# Patient Record
Sex: FEMALE | Race: WHITE | NOT HISPANIC OR LATINO | Employment: FULL TIME | URBAN - METROPOLITAN AREA
[De-identification: names, ages, dates, MRNs, and addresses within clinical notes are randomized per-mention and may not be internally consistent; named-entity substitution may affect disease eponyms.]

---

## 2018-12-20 LAB
ABORH_EXT (HISTORICAL CONVERSION): NORMAL
ANTIBODY_EXT (HISTORICAL CONVERSION): NEGATIVE
HBSAG_EXT (HISTORICAL CONVERSION): NORMAL
HGB_EXT (HISTORICAL CONVERSION): 13.2
HIV_EXT: NORMAL
PLT_EXT - HISTORICAL: 191
RPR - HISTORICAL: NORMAL
RUBELLA_EXT (HISTORICAL CONVERSION): NORMAL

## 2019-05-09 LAB
HGB_EXT (HISTORICAL CONVERSION): 13.6
PLT_EXT - HISTORICAL: 161

## 2019-07-25 ENCOUNTER — HOSPITAL ENCOUNTER (OUTPATIENT)
Dept: MEDSURG UNIT | Facility: CLINIC | Age: 34
Discharge: HOME | End: 2019-07-25
Attending: OBSTETRICS & GYNECOLOGY | Admitting: OBSTETRICS & GYNECOLOGY

## 2019-07-25 LAB
ABO/RH(D): NORMAL
ALBUMIN UR-MCNC: NEGATIVE MG/DL
ALT SERPL W P-5'-P-CCNC: 31 U/L (ref 0–45)
ANTIBODY SCREEN: NEGATIVE
APTT PPP: 28 SECONDS (ref 24–37)
AST SERPL W P-5'-P-CCNC: 25 U/L (ref 0–40)
CREAT SERPL-MCNC: 0.71 MG/DL (ref 0.6–1.1)
CREAT UR-MCNC: 29.8 MG/DL
ERYTHROCYTE [DISTWIDTH] IN BLOOD BY AUTOMATED COUNT: 12.5 % (ref 11–14.5)
GFR SERPL CREATININE-BSD FRML MDRD: >60 ML/MIN/1.73M2
GLUCOSE UR STRIP-MCNC: NEGATIVE MG/DL
HCT VFR BLD AUTO: 37.5 % (ref 35–47)
HGB BLD-MCNC: 12.9 G/DL (ref 12–16)
INR PPP: 0.94 (ref 0.9–1.1)
KETONES UR STRIP-MCNC: NEGATIVE MG/DL
MCH RBC QN AUTO: 30.6 PG (ref 27–34)
MCHC RBC AUTO-ENTMCNC: 34.4 G/DL (ref 32–36)
MCV RBC AUTO: 89 FL (ref 80–100)
PLATELET # BLD AUTO: 125 THOU/UL (ref 140–440)
PMV BLD AUTO: 12.6 FL (ref 8.5–12.5)
PROTEIN, RANDOM URINE - HISTORICAL: <7 MG/DL
PROTEIN/CREAT RATIO, RANDOM UR: NORMAL
RBC # BLD AUTO: 4.22 MILL/UL (ref 3.8–5.4)
URATE SERPL-MCNC: 5.2 MG/DL (ref 2–7.5)
WBC: 9 THOU/UL (ref 4–11)

## 2019-07-25 ASSESSMENT — MIFFLIN-ST. JEOR: SCORE: 1763.89

## 2019-07-26 ENCOUNTER — ANESTHESIA - HEALTHEAST (OUTPATIENT)
Dept: OBGYN | Facility: CLINIC | Age: 34
End: 2019-07-26

## 2019-07-26 ENCOUNTER — HOME CARE/HOSPICE - HEALTHEAST (OUTPATIENT)
Dept: HOME HEALTH SERVICES | Facility: HOME HEALTH | Age: 34
End: 2019-07-26

## 2019-07-29 ENCOUNTER — HOME CARE/HOSPICE - HEALTHEAST (OUTPATIENT)
Dept: HOME HEALTH SERVICES | Facility: HOME HEALTH | Age: 34
End: 2019-07-29

## 2019-07-29 ENCOUNTER — COMMUNICATION - HEALTHEAST (OUTPATIENT)
Dept: OBGYN | Facility: CLINIC | Age: 34
End: 2019-07-29

## 2019-08-03 ENCOUNTER — AMBULATORY - HEALTHEAST (OUTPATIENT)
Dept: PEDIATRICS | Facility: CLINIC | Age: 34
End: 2019-08-03

## 2019-10-29 ENCOUNTER — OFFICE VISIT - HEALTHEAST (OUTPATIENT)
Dept: FAMILY MEDICINE | Facility: CLINIC | Age: 34
End: 2019-10-29

## 2019-10-29 DIAGNOSIS — Z13.220 SCREENING, LIPID: ICD-10-CM

## 2019-10-29 DIAGNOSIS — R53.82 CHRONIC FATIGUE: ICD-10-CM

## 2019-10-29 LAB
ALBUMIN SERPL-MCNC: 4.9 G/DL (ref 3.5–5)
ALP SERPL-CCNC: 44 U/L (ref 45–120)
ALT SERPL W P-5'-P-CCNC: 16 U/L (ref 0–45)
ANION GAP SERPL CALCULATED.3IONS-SCNC: 14 MMOL/L (ref 5–18)
AST SERPL W P-5'-P-CCNC: 21 U/L (ref 0–40)
BASOPHILS # BLD AUTO: 0 THOU/UL (ref 0–0.2)
BASOPHILS NFR BLD AUTO: 0 % (ref 0–2)
BILIRUB SERPL-MCNC: 0.6 MG/DL (ref 0–1)
BUN SERPL-MCNC: 17 MG/DL (ref 8–22)
CALCIUM SERPL-MCNC: 10.7 MG/DL (ref 8.5–10.5)
CHLORIDE BLD-SCNC: 102 MMOL/L (ref 98–107)
CHOLEST SERPL-MCNC: 240 MG/DL
CO2 SERPL-SCNC: 24 MMOL/L (ref 22–31)
CREAT SERPL-MCNC: 1.07 MG/DL (ref 0.6–1.1)
EOSINOPHIL # BLD AUTO: 0.1 THOU/UL (ref 0–0.4)
EOSINOPHIL NFR BLD AUTO: 2 % (ref 0–6)
ERYTHROCYTE [DISTWIDTH] IN BLOOD BY AUTOMATED COUNT: 12.3 % (ref 11–14.5)
FASTING STATUS PATIENT QL REPORTED: NO
FERRITIN SERPL-MCNC: 81 NG/ML (ref 10–130)
GFR SERPL CREATININE-BSD FRML MDRD: 59 ML/MIN/1.73M2
GLUCOSE BLD-MCNC: 80 MG/DL (ref 70–125)
HCT VFR BLD AUTO: 41.3 % (ref 35–47)
HDLC SERPL-MCNC: 71 MG/DL
HGB BLD-MCNC: 13.7 G/DL (ref 12–16)
IRON SATN MFR SERPL: 32 % (ref 20–50)
IRON SERPL-MCNC: 91 UG/DL (ref 42–175)
LDLC SERPL CALC-MCNC: 157 MG/DL
LYMPHOCYTES # BLD AUTO: 2.1 THOU/UL (ref 0.8–4.4)
LYMPHOCYTES NFR BLD AUTO: 31 % (ref 20–40)
MCH RBC QN AUTO: 30.5 PG (ref 27–34)
MCHC RBC AUTO-ENTMCNC: 33.3 G/DL (ref 32–36)
MCV RBC AUTO: 92 FL (ref 80–100)
MONOCYTES # BLD AUTO: 0.4 THOU/UL (ref 0–0.9)
MONOCYTES NFR BLD AUTO: 6 % (ref 2–10)
NEUTROPHILS # BLD AUTO: 4.1 THOU/UL (ref 2–7.7)
NEUTROPHILS NFR BLD AUTO: 61 % (ref 50–70)
PLATELET # BLD AUTO: 215 THOU/UL (ref 140–440)
PMV BLD AUTO: 8.8 FL (ref 7–10)
POTASSIUM BLD-SCNC: 4.4 MMOL/L (ref 3.5–5)
PROT SERPL-MCNC: 7.8 G/DL (ref 6–8)
RBC # BLD AUTO: 4.51 MILL/UL (ref 3.8–5.4)
SODIUM SERPL-SCNC: 140 MMOL/L (ref 136–145)
TIBC SERPL-MCNC: 286 UG/DL (ref 313–563)
TRANSFERRIN SERPL-MCNC: 229 MG/DL (ref 212–360)
TRIGL SERPL-MCNC: 58 MG/DL
TSH SERPL DL<=0.005 MIU/L-ACNC: 1.69 UIU/ML (ref 0.3–5)
VIT B12 SERPL-MCNC: 753 PG/ML (ref 213–816)
WBC: 6.8 THOU/UL (ref 4–11)

## 2019-10-29 ASSESSMENT — MIFFLIN-ST. JEOR: SCORE: 1595.15

## 2019-10-30 LAB
25(OH)D3 SERPL-MCNC: 53.6 NG/ML (ref 30–80)
25(OH)D3 SERPL-MCNC: 53.6 NG/ML (ref 30–80)

## 2019-10-31 ENCOUNTER — COMMUNICATION - HEALTHEAST (OUTPATIENT)
Dept: FAMILY MEDICINE | Facility: CLINIC | Age: 34
End: 2019-10-31

## 2019-10-31 DIAGNOSIS — R89.9 ABNORMAL LABORATORY TEST: ICD-10-CM

## 2019-11-04 ENCOUNTER — AMBULATORY - HEALTHEAST (OUTPATIENT)
Dept: LAB | Facility: CLINIC | Age: 34
End: 2019-11-04

## 2019-11-04 DIAGNOSIS — R89.9 ABNORMAL LABORATORY TEST: ICD-10-CM

## 2019-11-04 LAB
CALCIUM SERPL-MCNC: 10 MG/DL (ref 8.5–10.5)
PTH-INTACT SERPL-MCNC: 42 PG/ML (ref 10–86)

## 2019-11-05 ENCOUNTER — COMMUNICATION - HEALTHEAST (OUTPATIENT)
Dept: FAMILY MEDICINE | Facility: CLINIC | Age: 34
End: 2019-11-05

## 2020-01-07 ENCOUNTER — COMMUNICATION - HEALTHEAST (OUTPATIENT)
Dept: SCHEDULING | Facility: CLINIC | Age: 35
End: 2020-01-07

## 2020-01-07 ENCOUNTER — OFFICE VISIT - HEALTHEAST (OUTPATIENT)
Dept: FAMILY MEDICINE | Facility: CLINIC | Age: 35
End: 2020-01-07

## 2020-01-07 DIAGNOSIS — R05.9 COUGH: ICD-10-CM

## 2020-01-07 DIAGNOSIS — J11.1 INFLUENZA-LIKE ILLNESS: ICD-10-CM

## 2020-01-07 LAB
FLUAV AG SPEC QL IA: NORMAL
FLUBV AG SPEC QL IA: NORMAL

## 2020-01-16 ENCOUNTER — COMMUNICATION - HEALTHEAST (OUTPATIENT)
Dept: FAMILY MEDICINE | Facility: CLINIC | Age: 35
End: 2020-01-16

## 2020-01-17 ENCOUNTER — OFFICE VISIT - HEALTHEAST (OUTPATIENT)
Dept: FAMILY MEDICINE | Facility: CLINIC | Age: 35
End: 2020-01-17

## 2020-01-17 DIAGNOSIS — R00.2 PALPITATIONS: ICD-10-CM

## 2020-01-17 DIAGNOSIS — R42 DIZZINESS: ICD-10-CM

## 2020-01-22 ENCOUNTER — HOSPITAL ENCOUNTER (OUTPATIENT)
Dept: CARDIOLOGY | Facility: CLINIC | Age: 35
Discharge: HOME | End: 2020-01-22
Attending: FAMILY MEDICINE

## 2020-01-22 DIAGNOSIS — R00.2 PALPITATIONS: ICD-10-CM

## 2020-01-22 DIAGNOSIS — R42 DIZZINESS: ICD-10-CM

## 2020-01-28 ENCOUNTER — COMMUNICATION - HEALTHEAST (OUTPATIENT)
Dept: FAMILY MEDICINE | Facility: CLINIC | Age: 35
End: 2020-01-28

## 2020-01-28 ENCOUNTER — OFFICE VISIT - HEALTHEAST (OUTPATIENT)
Dept: FAMILY MEDICINE | Facility: CLINIC | Age: 35
End: 2020-01-28

## 2020-01-28 DIAGNOSIS — R42 DIZZINESS: ICD-10-CM

## 2020-01-28 DIAGNOSIS — R00.2 PALPITATIONS: ICD-10-CM

## 2020-01-28 LAB
ALBUMIN SERPL-MCNC: 4.2 G/DL (ref 3.5–5)
ALP SERPL-CCNC: 50 U/L (ref 45–120)
ALT SERPL W P-5'-P-CCNC: 11 U/L (ref 0–45)
ANION GAP SERPL CALCULATED.3IONS-SCNC: 16 MMOL/L (ref 5–18)
AST SERPL W P-5'-P-CCNC: 15 U/L (ref 0–40)
BASOPHILS # BLD AUTO: 0.1 THOU/UL (ref 0–0.2)
BASOPHILS NFR BLD AUTO: 1 % (ref 0–2)
BILIRUB SERPL-MCNC: 0.2 MG/DL (ref 0–1)
BUN SERPL-MCNC: 15 MG/DL (ref 8–22)
C REACTIVE PROTEIN LHE: 1.4 MG/DL (ref 0–0.8)
CALCIUM SERPL-MCNC: 9.6 MG/DL (ref 8.5–10.5)
CHLORIDE BLD-SCNC: 102 MMOL/L (ref 98–107)
CO2 SERPL-SCNC: 21 MMOL/L (ref 22–31)
CREAT SERPL-MCNC: 0.85 MG/DL (ref 0.6–1.1)
EOSINOPHIL # BLD AUTO: 0.2 THOU/UL (ref 0–0.4)
EOSINOPHIL NFR BLD AUTO: 1 % (ref 0–6)
ERYTHROCYTE [DISTWIDTH] IN BLOOD BY AUTOMATED COUNT: 11.9 % (ref 11–14.5)
ERYTHROCYTE [SEDIMENTATION RATE] IN BLOOD BY WESTERGREN METHOD: 61 MM/HR (ref 0–20)
GFR SERPL CREATININE-BSD FRML MDRD: >60 ML/MIN/1.73M2
GLUCOSE BLD-MCNC: 98 MG/DL (ref 70–125)
HCT VFR BLD AUTO: 36.6 % (ref 35–47)
HGB BLD-MCNC: 12.6 G/DL (ref 12–16)
LYMPHOCYTES # BLD AUTO: 2.5 THOU/UL (ref 0.8–4.4)
LYMPHOCYTES NFR BLD AUTO: 23 % (ref 20–40)
MCH RBC QN AUTO: 30.3 PG (ref 27–34)
MCHC RBC AUTO-ENTMCNC: 34.3 G/DL (ref 32–36)
MCV RBC AUTO: 88 FL (ref 80–100)
MONOCYTES # BLD AUTO: 0.5 THOU/UL (ref 0–0.9)
MONOCYTES NFR BLD AUTO: 5 % (ref 2–10)
NEUTROPHILS # BLD AUTO: 7.4 THOU/UL (ref 2–7.7)
NEUTROPHILS NFR BLD AUTO: 70 % (ref 50–70)
PLATELET # BLD AUTO: 249 THOU/UL (ref 140–440)
PMV BLD AUTO: 8.6 FL (ref 7–10)
POTASSIUM BLD-SCNC: 4 MMOL/L (ref 3.5–5)
PROT SERPL-MCNC: 7.9 G/DL (ref 6–8)
RBC # BLD AUTO: 4.15 MILL/UL (ref 3.8–5.4)
SODIUM SERPL-SCNC: 139 MMOL/L (ref 136–145)
TSH SERPL DL<=0.005 MIU/L-ACNC: 1.58 UIU/ML (ref 0.3–5)
VIT B12 SERPL-MCNC: 750 PG/ML (ref 213–816)
WBC: 10.6 THOU/UL (ref 4–11)

## 2020-01-28 ASSESSMENT — ANXIETY QUESTIONNAIRES
7. FEELING AFRAID AS IF SOMETHING AWFUL MIGHT HAPPEN: NOT AT ALL
6. BECOMING EASILY ANNOYED OR IRRITABLE: NOT AT ALL
IF YOU CHECKED OFF ANY PROBLEMS ON THIS QUESTIONNAIRE, HOW DIFFICULT HAVE THESE PROBLEMS MADE IT FOR YOU TO DO YOUR WORK, TAKE CARE OF THINGS AT HOME, OR GET ALONG WITH OTHER PEOPLE: NOT DIFFICULT AT ALL
2. NOT BEING ABLE TO STOP OR CONTROL WORRYING: NOT AT ALL
GAD7 TOTAL SCORE: 1
3. WORRYING TOO MUCH ABOUT DIFFERENT THINGS: NOT AT ALL
4. TROUBLE RELAXING: SEVERAL DAYS
5. BEING SO RESTLESS THAT IT IS HARD TO SIT STILL: NOT AT ALL
1. FEELING NERVOUS, ANXIOUS, OR ON EDGE: NOT AT ALL

## 2020-01-28 ASSESSMENT — MIFFLIN-ST. JEOR: SCORE: 1500.8

## 2020-01-28 ASSESSMENT — PATIENT HEALTH QUESTIONNAIRE - PHQ9: SUM OF ALL RESPONSES TO PHQ QUESTIONS 1-9: 8

## 2020-01-29 LAB
25(OH)D3 SERPL-MCNC: 55.9 NG/ML (ref 30–80)
B BURGDOR IGG+IGM SER QL: 0.13 INDEX VALUE

## 2020-01-31 ENCOUNTER — COMMUNICATION - HEALTHEAST (OUTPATIENT)
Dept: FAMILY MEDICINE | Facility: CLINIC | Age: 35
End: 2020-01-31

## 2020-02-03 LAB — ANA SER QL: 0.4 U

## 2020-02-04 ENCOUNTER — AMBULATORY - HEALTHEAST (OUTPATIENT)
Dept: FAMILY MEDICINE | Facility: CLINIC | Age: 35
End: 2020-02-04

## 2020-02-04 DIAGNOSIS — R42 DIZZINESS: ICD-10-CM

## 2020-02-06 ENCOUNTER — AMBULATORY - HEALTHEAST (OUTPATIENT)
Dept: FAMILY MEDICINE | Facility: CLINIC | Age: 35
End: 2020-02-06

## 2020-02-06 DIAGNOSIS — R89.9 ABNORMAL LABORATORY TEST: ICD-10-CM

## 2020-02-07 ENCOUNTER — AMBULATORY - HEALTHEAST (OUTPATIENT)
Dept: LAB | Facility: CLINIC | Age: 35
End: 2020-02-07

## 2020-02-07 DIAGNOSIS — R89.9 ABNORMAL LABORATORY TEST: ICD-10-CM

## 2020-02-07 LAB — CORTIS SERPL-MCNC: 17.4 UG/DL

## 2020-06-25 ENCOUNTER — VIRTUAL VISIT (OUTPATIENT)
Dept: FAMILY MEDICINE | Facility: OTHER | Age: 35
End: 2020-06-25

## 2020-06-26 ENCOUNTER — AMBULATORY - HEALTHEAST (OUTPATIENT)
Dept: FAMILY MEDICINE | Facility: CLINIC | Age: 35
End: 2020-06-26

## 2020-06-26 DIAGNOSIS — Z20.822 SUSPECTED COVID-19 VIRUS INFECTION: ICD-10-CM

## 2020-06-26 DIAGNOSIS — J06.9 ACUTE UPPER RESPIRATORY INFECTION, UNSPECIFIED: ICD-10-CM

## 2020-06-26 NOTE — PROGRESS NOTES
"Date: 2020 12:03:02  Clinician: Paul Eden  Clinician NPI: [id number]  Patient: Vadim Atkinson  Patient : 1985  Patient Address: [address] [place] [address]  Patient Phone: [phone]  Visit Protocol: URI  Patient Summary:  Vadim is a 35 year old ( : 1985 ) female who initiated a Visit for COVID-19 (Coronavirus) evaluation and screening. When asked the question \"Please sign me up to receive news, health information and promotions from [employer].\", Vadim responded \"No\".    Vadim states her symptoms started 1-2 days ago.   Her symptoms consist of nausea, diarrhea, rhinitis, and a sore throat.   Symptom details     Nasal secretions: The color of her mucus is clear.    Sore throat: Vadim reports having moderate throat pain (4-6 on a 10 point pain scale), does not have exudate on her tonsils, and can swallow liquids. She is not sure if the lymph nodes in her neck are enlarged. A rash has not appeared on the skin since the sore throat started.      Vadim denies having wheezing, teeth pain, ageusia, vomiting, malaise, ear pain, headache, chills, myalgias, anosmia, facial pain or pressure, fever, cough, and nasal congestion. She also denies having recent facial or sinus surgery in the past 60 days and taking antibiotic medication in the past month. She is not experiencing dyspnea.   Precipitating events  Within the past week, Vadim has not been exposed to someone with strep throat.   Pertinent COVID-19 (Coronavirus) information  In the past 14 days, Vadim has not worked in a congregate living setting.   She does not work or volunteer as healthcare worker or a  and does not work or volunteer in a healthcare facility.   Vadim also has not lived in a congregate living setting in the past 14 days. She lives with a healthcare worker.   Vadim has not had a close contact with a laboratory-confirmed COVID-19 patient within 14 days of symptom " onset.   Pertinent medical history  Vadim does not get yeast infections when she takes antibiotics.   Vadim does not need a return to work/school note.   Weight: 165 lbs   Vadim does not smoke or use smokeless tobacco.   She is pregnant and denies breastfeeding.   Weight: 165 lbs    MEDICATIONS: doxylamine-pyridoxine (vit B6) oral, ondansetron HCl oral, ALLERGIES: Sulfa (Sulfonamide Antibiotics)  Clinician Response:  Dear Vadim,   Your symptoms show that you may have coronavirus (COVID-19). This illness can cause fever, cough and trouble breathing. Many people get a mild case and get better on their own. Some people can get very sick.  What should I do?  We would like to test you for this virus.   1. Please call [phone] to schedule your visit. Explain that you were referred by [facility] to have a COVID-19 test. Be ready to share your [facility] visit ID number.  The following will serve as your written order for this COVID Test, ordered by me, for the indication of suspected COVID [Z20.828]: The test will be ordered in [employer], our electronic health record, after you are scheduled. It will show as ordered and authorized by Salvador Amador MD.  Order: COVID-19 (Coronavirus) PCR for SYMPTOMATIC testing from [facility].      2. When it's time for your COVID test:  Stay at least 6 feet away from others. (If someone will drive you to your test, stay in the backseat, as far away from the  as you can.)   Cover your mouth and nose with a mask, tissue or washcloth.  Go straight to the testing site. Don't make any stops on the way there or back.      3.Starting now: Stay home and away from others (self-isolate) until:   You've had no fever---and no medicine that reduces fever---for 3 full days (72 hours). And...   Your other symptoms have gotten better. For example, your cough or breathing has improved. And...   At least 10 days have passed since your symptoms started.       During this time, don't leave the house  "except for testing or medical care.   Stay in your own room, even for meals. Use your own bathroom if you can.   Stay away from others in your home. No hugging, kissing or shaking hands. No visitors.  Don't go to work, school or anywhere else.    Clean \"high touch\" surfaces often (doorknobs, counters, handles, etc.). Use a household cleaning spray or wipes. You'll find a full list of  on the EPA website: www.epa.gov/pesticide-registration/list-n-disinfectants-use-against-sars-cov-2.   Cover your mouth and nose with a mask, tissue or washcloth to avoid spreading germs.  Wash your hands and face often. Use soap and water.  Caregivers in these groups are at risk for severe illness due to COVID-19:  o People 65 years and older  o People who live in a nursing home or long-term care facility  o People with chronic disease (lung, heart, cancer, diabetes, kidney, liver, immunologic)  o People who have a weakened immune system, including those who:   Are in cancer treatment  Take medicine that weakens the immune system, such as corticosteroids  Had a bone marrow or organ transplant  Have an immune deficiency  Have poorly controlled HIV or AIDS  Are obese (body mass index of 40 or higher)  Smoke regularly   o Caregivers should wear gloves while washing dishes, handling laundry and cleaning bedrooms and bathrooms.  o Use caution when washing and drying laundry: Don't shake dirty laundry, and use the warmest water setting that you can.  o For more tips, go to www.cdc.gov/coronavirus/2019-ncov/downloads/10Things.pdf.      How can I take care of myself?   Get lots of rest. Drink extra fluids (unless a doctor has told you not to).   Take Tylenol (acetaminophen) for fever or pain. If you have liver or kidney problems, ask your family doctor if it's okay to take Tylenol.   Adults can take either:    650 mg (two 325 mg pills) every 4 to 6 hours, or...   1,000 mg (two 500 mg pills) every 8 hours as needed.    Note: Don't take " more than 3,000 mg in one day. Acetaminophen is found in many medicines (both prescribed and over-the-counter medicines). Read all labels to be sure you don't take too much.   For children, check the Tylenol bottle for the right dose. The dose is based on the child's age or weight.    If you have other health problems (like cancer, heart failure, an organ transplant or severe kidney disease): Call your specialty clinic if you don't feel better in the next 2 days.       Know when to call 911. Emergency warning signs include:    Trouble breathing or shortness of breath Pain or pressure in the chest that doesn't go away Feeling confused like you haven't felt before, or not being able to wake up Bluish-colored lips or face.  Where can I get more information?   [facility] -- About COVID-19: [email or web address]   CDC -- What to Do If You're Sick: www.cdc.gov/coronavirus/2019-ncov/about/steps-when-sick.html   CDC -- Ending Home Isolation: www.cdc.gov/coronavirus/2019-ncov/hcp/disposition-in-home-patients.html   CDC -- Caring for Someone: www.cdc.gov/coronavirus/2019-ncov/if-you-are-sick/care-for-someone.html   [facility] -- Interim Guidance for Hospital Discharge to Home: www.health.[facility].[place].us/diseases/coronavirus/hcp/hospdischarge.pdf   [facility] clinical trials (COVID-19 research studies): clinicalaffairs.[facility]-clinical-trials    Below are the COVID-19 hotlines at the [facility] ([facility]). Interpreters are available.    For health questions: Call 996-369-5643 or 1-268.240.9399 (7 a.m. to 7 p.m.) For questions about schools and childcare: Call 429-760-0726 or 1-443.959.7539 (7 a.m. to 7 p.m.)    Diagnosis: Acute upper respiratory infection, unspecified  Diagnosis ICD: J06.9

## 2020-06-27 ENCOUNTER — AMBULATORY - HEALTHEAST (OUTPATIENT)
Dept: FAMILY MEDICINE | Facility: CLINIC | Age: 35
End: 2020-06-27

## 2020-06-27 DIAGNOSIS — Z20.822 SUSPECTED COVID-19 VIRUS INFECTION: ICD-10-CM

## 2020-06-30 ENCOUNTER — COMMUNICATION - HEALTHEAST (OUTPATIENT)
Dept: FAMILY MEDICINE | Facility: CLINIC | Age: 35
End: 2020-06-30

## 2020-11-09 ENCOUNTER — VIRTUAL VISIT (OUTPATIENT)
Dept: FAMILY MEDICINE | Facility: OTHER | Age: 35
End: 2020-11-09

## 2020-11-10 NOTE — PROGRESS NOTES
"Date: 2020 21:42:11  Clinician: Jem Byrd  Clinician NPI: [id number]  Patient: Vadim Atkinson  Patient : 1985  Patient Address: [address] [place] [address]  Patient Phone: [phone]  Visit Protocol: URI  Patient Summary:  Vadim is a 35 year old ( : 1985 ) female who initiated a [facility] Visit for COVID-19 (Coronavirus) evaluation and screening. When asked the question \"Please sign me up to receive news, health information and promotions from [facility].\", Vadim responded \"No\".    Vadim states her symptoms started 1-2 days ago.   Her symptoms consist of rhinitis, malaise, a sore throat, a cough, and nausea.   Symptom details     Nasal secretions: The color of her mucus is yellow and clear.    Cough: Vadim coughs a few times an hour and her cough is not more bothersome at night. Phlegm does not come into her throat when she coughs. She does not believe her cough is caused by post-nasal drip.     Sore throat: Vadim reports having moderate throat pain (4-6 on a 10 point pain scale), does not have exudate on her tonsils, and can swallow liquids. The lymph nodes in her neck are not enlarged. A rash has not appeared on the skin since the sore throat started.      Vadim denies having vomiting, facial pain or pressure, myalgias, chills, teeth pain, ageusia, diarrhea, ear pain, headache, wheezing, fever, enlarged lymph nodes, nasal congestion, and anosmia. She also denies taking antibiotic medication in the past month and having recent facial or sinus surgery in the past 60 days. She is not experiencing dyspnea.   Precipitating events  Within the past week, Vadim has not been exposed to someone with strep throat. She has not recently been exposed to someone with influenza. Vadim has been in close contact with the following high risk individuals: pregnant women and children under the age of 5.   Pertinent COVID-19 (Coronavirus) information  Vadim " does not work or volunteer as healthcare worker or a . In the past 14 days, Vadim has not worked or volunteered at a healthcare facility or group living setting.   In the past 14 days, she also has not lived in a congregate living setting.   Vadim has not had a close contact with a laboratory-confirmed COVID-19 patient within 14 days of symptom onset.    Since December 2019, Vadim has been tested for COVID-19 and has had upper respiratory infection (URI) or influenza-like illness.      Result of COVID-19 test: Negative     Date of her COVID-19 test: 05/25/2020     Date(s) of previous URI or influenza-like illness (free-text): Mid January 2020     Symptoms Vadim experienced during previous URI or influenza-like illness as reported by the patient (free-text): High fever, headache, muscle pain, sweating, fatigue, congestion, palpitations, dizziness, vomiting        Pertinent medical history  Vadim does not get yeast infections when she takes antibiotics.   Vadim does not need a return to work/school note.   Weight: 186 lbs   Vadim does not smoke or use smokeless tobacco.   She is pregnant and denies breastfeeding.   Weight: 186 lbs    MEDICATIONS: doxylamine-pyridoxine (vit B6) oral, ondansetron HCl oral, ALLERGIES: Sulfa (Sulfonamide Antibiotics), Sulfa (Sulfonamide Antibiotics)  Clinician Response:  Dear Vadim,   Your symptoms show that you may have coronavirus (COVID-19). This illness can cause fever, cough and trouble breathing. Many people get a mild case and get better on their own. Some people can get very sick.  What should I do?  We would like to test you for this virus.   1. Please call [phone] to schedule your visit. Explain that you were referred by [facility] to have a COVID-19 test. Be ready to share your [facility] visit ID number.  * If you need to schedule in [facility] please call [phone] or for [employer] employees please call [phone].  * If  "you need to schedule in the [place] area please call [phone]. [place] employees call [phone].  The following will serve as your written order for this COVID Test, ordered by me, for the indication of suspected COVID [Z20.828]: The test will be ordered in [employer], our electronic health record, after you are scheduled. It will show as ordered and authorized by Salvador Amador MD.  Order: COVID-19 (Coronavirus) PCR for SYMPTOMATIC testing from [facility].   2. When it's time for your COVID test:  Stay at least 6 feet away from others. (If someone will drive you to your test, stay in the backseat, as far away from the  as you can.)   Cover your mouth and nose with a mask, tissue or washcloth.  Go straight to the testing site. Don't make any stops on the way there or back.      3.Starting now: Stay home and away from others (self-isolate) until:   You've had no fever---and no medicine that reduces fever---for one full day (24 hours). And...   Your other symptoms have gotten better. For example, your cough or breathing has improved. And...   At least 10 days have passed since your symptoms started.       During this time, don't leave the house except for testing or medical care.   Stay in your own room, even for meals. Use your own bathroom if you can.   Stay away from others in your home. No hugging, kissing or shaking hands. No visitors.  Don't go to work, school or anywhere else.    Clean \"high touch\" surfaces often (doorknobs, counters, handles, etc.). Use a household cleaning spray or wipes. You'll find a full list of  on the EPA website: www.epa.gov/pesticide-registration/list-n-disinfectants-use-against-sars-cov-2.   Cover your mouth and nose with a mask, tissue or washcloth to avoid spreading germs.  Wash your hands and face often. Use soap and water.  Caregivers in these groups are at risk for severe illness due to COVID-19:  o People 65 years and older  o People who live in a nursing home or long-term " care facility  o People with chronic disease (lung, heart, cancer, diabetes, kidney, liver, immunologic)  o People who have a weakened immune system, including those who:   Are in cancer treatment  Take medicine that weakens the immune system, such as corticosteroids  Had a bone marrow or organ transplant  Have an immune deficiency  Have poorly controlled HIV or AIDS  Are obese (body mass index of 40 or higher)  Smoke regularly   o Caregivers should wear gloves while washing dishes, handling laundry and cleaning bedrooms and bathrooms.  o Use caution when washing and drying laundry: Don't shake dirty laundry, and use the warmest water setting that you can.  o For more tips, go to www.cdc.gov/coronavirus/2019-ncov/downloads/10Things.pdf.    4.Sign up for [employer]. We know it's scary to hear that you might have COVID-19. We want to track your symptoms to make sure you're okay over the next 2 weeks. Please look for an email from [employer]---this is a free, online program that we'll use to keep in touch. To sign up, follow the link in the email. Learn more at [email or web address]  How can I take care of myself?   Get lots of rest. Drink extra fluids (unless a doctor has told you not to).   Take Tylenol (acetaminophen) for fever or pain. If you have liver or kidney problems, ask your family doctor if it's okay to take Tylenol.   Adults can take either:    650 mg (two 325 mg pills) every 4 to 6 hours, or...   1,000 mg (two 500 mg pills) every 8 hours as needed.    Note: Don't take more than 3,000 mg in one day. Acetaminophen is found in many medicines (both prescribed and over-the-counter medicines). Read all labels to be sure you don't take too much.   For children, check the Tylenol bottle for the right dose. The dose is based on the child's age or weight.    If you have other health problems (like cancer, heart failure, an organ transplant or severe kidney disease): Call your specialty clinic if you  don't feel better in the next 2 days.       Know when to call 911. Emergency warning signs include:    Trouble breathing or shortness of breath Pain or pressure in the chest that doesn't go away Feeling confused like you haven't felt before, or not being able to wake up Bluish-colored lips or face.  Where can I get more information?   [facility] -- About COVID-19: [email or web address]   CDC -- What to Do If You're Sick: www.cdc.gov/coronavirus/2019-ncov/about/steps-when-sick.html   CDC -- Ending Home Isolation: www.cdc.gov/coronavirus/2019-ncov/hcp/disposition-in-home-patients.html   CDC -- Caring for Someone: www.cdc.gov/coronavirus/2019-ncov/if-you-are-sick/care-for-someone.html   [facility] -- Interim Guidance for Hospital Discharge to Home: www.[facility].[place]./diseases/coronavirus/hcp/hospdischarge.pdf   [facility] clinical trials (COVID-19 research studies): clinicalaffairs.[facility]-clinical-trials    Below are the COVID-19 hotlines at the [facility] ([facility]). Interpreters are available.    For health questions: Call 068-193-9585 or 1-813.550.5857 (7 a.m. to 7 p.m.) For questions about schools and childcare: Call 942-916-9777 or 1-276.757.1516 (7 a.m. to 7 p.m.)    Diagnosis: Cough  Diagnosis ICD: R05

## 2020-11-13 ENCOUNTER — AMBULATORY - HEALTHEAST (OUTPATIENT)
Dept: FAMILY MEDICINE | Facility: CLINIC | Age: 35
End: 2020-11-13

## 2020-11-13 DIAGNOSIS — Z20.822 SUSPECTED 2019 NOVEL CORONAVIRUS INFECTION: ICD-10-CM

## 2020-11-15 ENCOUNTER — COMMUNICATION - HEALTHEAST (OUTPATIENT)
Dept: SCHEDULING | Facility: CLINIC | Age: 35
End: 2020-11-15

## 2021-01-15 ENCOUNTER — ANESTHESIA - HEALTHEAST (OUTPATIENT)
Dept: OBGYN | Facility: CLINIC | Age: 36
End: 2021-01-15

## 2021-01-15 ENCOUNTER — COMMUNICATION - HEALTHEAST (OUTPATIENT)
Dept: SCHEDULING | Facility: CLINIC | Age: 36
End: 2021-01-15

## 2021-01-16 ENCOUNTER — HOME CARE/HOSPICE - HEALTHEAST (OUTPATIENT)
Dept: HOME HEALTH SERVICES | Facility: HOME HEALTH | Age: 36
End: 2021-01-16

## 2021-01-19 ENCOUNTER — HOME CARE/HOSPICE - HEALTHEAST (OUTPATIENT)
Dept: HOME HEALTH SERVICES | Facility: HOME HEALTH | Age: 36
End: 2021-01-19

## 2021-01-21 ENCOUNTER — COMMUNICATION - HEALTHEAST (OUTPATIENT)
Dept: SCHEDULING | Facility: CLINIC | Age: 36
End: 2021-01-21

## 2021-03-16 ENCOUNTER — RECORDS - HEALTHEAST (OUTPATIENT)
Dept: ADMINISTRATIVE | Facility: OTHER | Age: 36
End: 2021-03-16

## 2021-03-22 ENCOUNTER — OFFICE VISIT - HEALTHEAST (OUTPATIENT)
Dept: CARDIOLOGY | Facility: CLINIC | Age: 36
End: 2021-03-22

## 2021-03-22 DIAGNOSIS — I10 ESSENTIAL HYPERTENSION: ICD-10-CM

## 2021-03-22 ASSESSMENT — MIFFLIN-ST. JEOR: SCORE: 1576.78

## 2021-04-08 ENCOUNTER — RECORDS - HEALTHEAST (OUTPATIENT)
Dept: ADMINISTRATIVE | Facility: OTHER | Age: 36
End: 2021-04-08

## 2021-05-27 VITALS
DIASTOLIC BLOOD PRESSURE: 85 MMHG | HEART RATE: 57 BPM | SYSTOLIC BLOOD PRESSURE: 132 MMHG | TEMPERATURE: 98.3 F | RESPIRATION RATE: 16 BRPM

## 2021-05-27 ASSESSMENT — PATIENT HEALTH QUESTIONNAIRE - PHQ9: SUM OF ALL RESPONSES TO PHQ QUESTIONS 1-9: 8

## 2021-05-28 ASSESSMENT — ANXIETY QUESTIONNAIRES: GAD7 TOTAL SCORE: 1

## 2021-05-30 NOTE — PLAN OF CARE
Updated Dr. Reich regarding lab results, BP results, 02 sats at 100%, category 1 tracing with ctx q2-5 minutes. Plan to offer SVE to patient to help determine logical plan. Given pt has had 1 elevated BP in clinic and has had 2 here she would recommend induction but would like to talk to patient.

## 2021-05-30 NOTE — TELEPHONE ENCOUNTER
OB Follow Up Phone Call    Patient: Vadim Atkinson  : 1985  MRN: [id number]     [facility]  Provider No primary care provider on file.      :   N/A    Language:   English    Discharge Follow-Up:  Follow-Up call by Outreach nurse: Message left for patient    Type of Delivery:      Feeding Method:  Breastfeeding    Comments:   Left message with [facility] phone number for patient to call back if desired. Reminded patient to schedule postpartum follow-up appointment as directed by PCP at discharge.  Encouraged patient to call clinic/PCP with questions or concerns.    Completed by:   Francoise Mcmahon RN      Patient: Vadim Atkinson  : 1985  MRN: [id number]

## 2021-05-31 ENCOUNTER — RECORDS - HEALTHEAST (OUTPATIENT)
Dept: ADMINISTRATIVE | Facility: CLINIC | Age: 36
End: 2021-05-31

## 2021-05-31 NOTE — PROGRESS NOTES
"[facility] Pediatrics Lactation Visit     Assessment:     1.  difficulty in feeding at breast         Marium has had appropriate weight gain and is back to birth weight today. She has been primarily bottle fed and mom is pumping every time she is fed. Marium is able to latch to the breast appropriately and occasional swallows are heard, but her latch feels weak to mom. She transferred 0.8 oz at the breast today. She does have a tight lingual frenulum with attachment 3 mm from apex of tongue. She is able to extend her tongue past her gum line. Lotus assessment scored her a 7 (down from 12 in the hospital). Dad has a history of tongue tie which was revised when he was a toddler. Typically revision is not recommended unless there is a score of 12+, but I do think this may be contributing to her lack of transfer. Parents plan to follow up with Dr. Arevalo at [facility] in 4 days.      Mom's milk supply is slightly low but very close to meeting 100% of Marium's needs. She currently has hypertension controlled with labetalol and nifedipine which is likely a contributing factor to mom's slightly reduced supply.      Plan:     Breastfeed on demand, as many times per day as is right for you. Aim for at least a couple of times per day.     Offer both sides every time, and alternate which breast you start on. Latch baby deeply by making a \"breast sandwich,\" and aim your nipple for the roof of the mouth. If baby's lips are rolled inward, flip the top lip out with your finger, and then apply gentle downward pressure to the chin to help the lips flange out like \"fish lips.\" If you have pain that lasts beyond the initial latch-on, always restart. When sucking/swallowing frequency starts to slow down, do breast compressions/massage and tickle baby's feet to keep her alert with feeding. A diaper change between sides can be helpful to keep her alert.     Supplementation plan: Continue to offer expressed breast milk or formula " as she cues. See chart below for typical intake by age       Recommended to pump: Aim to pump at the same frequency that she eats for maximum milk production. OK to get a 4 hour stretch over night and then make it up during the day so that you pump at least 8 times in 24 hours.  Continue to monitor output, expect at least 6 wet diapers per day.      Follow up as needed for ongoing lactation support           SUBJECTIVE:      Marium is here today with mom, Destiny, and dad Terry, for lactation support. She is a 8 days female born at Gestational Age: 39w3d now 8 days.    She is doing well. She has gained 7 oz since last visit 5 days ago. She has gained approximately 1.4 oz per day over the past 5 days.     Baby is nursing about 2-3 times per day, usually only once is successful. She typically nurses for about 5 minutes per session.   Mother reports usually not hearing audible swallows.   Baby bottle feeds about 10 times in 24 hours.   Baby is supplemented with expressed breast milk or formula, about 1.5 - 3 oz at a time.  Mom is also pumping about 10 times per day and gets about 1.5 - 4 oz per pumping session.  Number of wet diapers in 24 hours: 7  Number of stools in 24 hours: 6  Color and consistency of stools: yellow, seedy  Mom noticed her breasts grew larger and areolas darkened during pregnancy and she noticed primary engorgement when her milk came in on day 3.        Breastfeeding Goals: Mom would like to nurse 85% of the time and then pump and give two bottles per day     Previous Breastfeeding Experience: none  Breast-surgery: no  Maternal medications: Labetalol, nifedipine      Hospital course: Concerns: Baby had persistent low sugars with attempted breastfeeding during the first 24 hours of life, so she received glucose gel and donor breast milk to supplement. She was transitioned to 24 kcal formula for 1 day while her blood sugar stabilized. On the morning of discharge, she transitioned to breastfeeding  followed by 22 kcal formula supplementation. Spot blood glucose checks were normal. Parents were comfortable with breastfeeding and then offering formula afterward until she follows up with her PCP. She is voiding and stooling normally.              Results for orders placed or performed during the hospital encounter of 19   [place] Metabolic Screen   Result Value Ref Range     Scan Result See Scanned Report     POCT Glucose - STAT   Result Value Ref Range     Glucose 39 (LL) 51 - 139 mg/dL   POCT Glucose   Result Value Ref Range     Glucose 38 (LL) 51 - 139 mg/dL   POCT Glucose   Result Value Ref Range     Glucose 54 51 - 139 mg/dL   POCT Glucose   Result Value Ref Range     Glucose 36 (LL) 51 - 139 mg/dL   POCT Glucose   Result Value Ref Range     Glucose 49 (L) 51 - 139 mg/dL   POCT Glucose   Result Value Ref Range     Glucose 73 51 - 139 mg/dL   POCT Glucose   Result Value Ref Range     Glucose 43 (L) 51 - 139 mg/dL   POCT Glucose   Result Value Ref Range     Glucose 56 51 - 139 mg/dL   POCT Glucose   Result Value Ref Range     Glucose 41 (L) 51 - 139 mg/dL   POCT Glucose   Result Value Ref Range     Glucose 42 (L) 51 - 139 mg/dL   POCT Glucose   Result Value Ref Range     Glucose 60 51 - 139 mg/dL   POCT Glucose   Result Value Ref Range     Glucose 66 51 - 139 mg/dL   POCT Glucose   Result Value Ref Range     Glucose 42 (L) 51 - 139 mg/dL   POCT Glucose   Result Value Ref Range     Glucose 53 51 - 139 mg/dL   POCT Glucose   Result Value Ref Range     Glucose 62 51 - 139 mg/dL   POCT Glucose   Result Value Ref Range     Glucose 46 (L) 51 - 139 mg/dL   POCT Glucose   Result Value Ref Range     Glucose 52 51 - 139 mg/dL   POCT Glucose   Result Value Ref Range     Glucose 57 51 - 139 mg/dL      No current outpatient medications on file.  No past medical history on file.  No past surgical history on file.  No family history on file.        Primary care provider: Marilynn Saez,  "MD     OBJECTIVE:     Mother:   Nipples are everted, the areola is compressible, the breast is soft.      Sore nipples: None   Maternal depression screening: Doing well  EPDS: 2     Infant:      Age today: 8 days         Vitals:     08/03/19 1503   Pulse: 152   Temp: 98.8  F (37.1  C)   SpO2: 99%            Weight:       Wt Readings from Last 3 Encounters:   08/03/19 7 lb 13.2 oz (3.549 kg) (55 %, Z= 0.13)*   07/29/19 7 lb 6 oz (3.345 kg) (52 %, Z= 0.04)*   07/27/19 7 lb 11.6 oz (3.504 kg) (69 %, Z= 0.51)*      * Growth percentiles are based on WHO (Girls, 0-2 years) data.         Birthweight:  7 lb 14 oz (3.572 kg).   Today's weight:    Vitals       Vitals:     08/03/19 1503   Weight: 7 lb 13.2 oz (3.549 kg)      . This is -1% from birth weight.   Average weight gain over last 5 days: 1.4 oz/day.        Test weights:     LEFT side: 0.3 oz  RIGHT side: 0.5 oz     TOTAL transfer:  0.8 oz        Feeding assessment:      Digital suck assessment:  Infant draws consultant's finger into mouth, palate intact, tongue over gums, tight lingual frenulum with attachment between mid and apex of tongue.      Baby can hold suction with tongue while at the breast. Mom feels her suck was \"weak\" - she felt that it was stronger in the hospital.     Alignment: Baby's head was aligned with its trunk. Baby did face mother. Baby was in football position today.      Areolar Grasp: Baby was able to open mouth wide. Baby's lips were not pursed. Baby's lips did flange outward. Tongue was visible just barely over bottom lip. Baby had complete seal.      Areolar Compression: Baby made rhythmic motion. There were no clicking or smacking sounds. There was no severe nipple discomfort.  Nipples appeared round after feeding.     Audible swallowing: Baby made quiet sounds of swallowing: There was an increase in frequency after milk ejection reflex. The milk ejection reflex is appropriate and milk supply appears adequate/ slightly low.      PHYSICAL " EXAM     Gen: Alert, no acute distress.   Head: Anterior fontanelle flat and soft.   Mouth:Lips pink. Oral mucosa moist. Tongue midline (good lateralization, movement, and lift; able to extend pass lower gumline).  Palate intact. Coordinated suck. tight lingual frenulum with attachment between mid and apex of tongue   Lungs: Clear to auscultation bilaterally.   Cardiac: Regular regular rate and rhythm, S1S2, no murmurs.  Abdomen: Soft, nontender, bowel sounds present, no hepatosplenomegaly or mass palpable. Umbilicus dry with no erythema or drainage.   : Shaka stage 1 female genitalia  Skin: Intact, dry, appropriate coloring for ethnicity, no jaundice.   Neuro: Appropriate muscle tone.     The visit lasted a total of 60 minutes that I spent face to face with the patient. Of that time, 60 minutes were spent on lactation. Over 50% of the time was spent counseling and educating the patient about feeding difficulties and lactation concerns.      Completed by:   LILY Iverson, IBCLC, [facility], Pediatrics.  8/3/2019 3:16 PM

## 2021-06-02 NOTE — PROGRESS NOTES
ASSESSMENT:  1. Chronic fatigue    We did spend some time today discussing that some of these changes that she is experiencing may be due to the fact that she had some pretty significant preeclampsia during the last trimester of her pregnancy and that did take some time for her body to readjust, operatively since she has been on medications recently and now is off of them.  We will however do some blood work to make sure that there is no other reasons why she is having these issues, and we can follow-up with her on the results when they become available.    Otherwise we discussed getting good amounts of exercise, drinking adequate amounts of fluids, and eating well.  She also understands the importance of getting some sleep which of course is a bit challenging at this stage of her child's infancy.    - HM1(CBC and Differential)  - Comprehensive Metabolic Panel  - Thyroid Stimulating Hormone (TSH)  - Iron and Transferrin Iron Binding Capacity  - Ferritin  - Vitamin B12  - Vitamin D, Total (25-Hydroxy)  - HM1 (CBC with Diff)    2. Screening, lipid    - Lipid Profile          PLAN:  There are no Patient Instructions on file for this visit.    Orders Placed This Encounter   Procedures     Lipid Profile     Order Specific Question:   Fasting is required?     Answer:   Yes     Comprehensive Metabolic Panel     Thyroid Stimulating Hormone (TSH)     Iron and Transferrin Iron Binding Capacity     Ferritin     Vitamin B12     Vitamin D, Total (25-Hydroxy)     HM1 (CBC with Diff)     Medications Discontinued During This Encounter   Medication Reason     labetalol (NORMODYNE) 300 MG tablet Therapy completed     NIFEdipine (PROCARDIA XL) 30 MG 24 hr tablet Therapy completed       No follow-ups on file.    CHIEF COMPLAINT:  Chief Complaint   Patient presents with     Establish Care     Orthostatic hypertension and dizziness and vision narrowing/blackness, she is 3 months port partum and had pre-eclampsia       HISTORY OF PRESENT  "ILLNESS:  Vadim is a 34 y.o. female presenting to the clinic today with some concerns about dizziness and fatigue.  She has been to this clinic before but it for more than 3 years.  She has been describing some feelings of orthostatic hypotension and dizziness with some vision changes and almost blacking out but never quite passing out.    Of note she is 3 months postpartum, and during the pregnancy she had a significant amount of preeclampsia and her blood pressure actually needed medication.  She had some of the symptoms when she went on medication and now that her blood pressure is resolved she is gone off of the medication and feeling in a similar way.  She is been trying to exercise when she can.  She has not really been sleeping well of course with a small infant.  She will get the sometimes when going from sitting to standing or laying to standing.  She will get the familiar \"head rush\" feeling but it seems to be a bit worse and is starting to get slowly better over the last couple of weeks.    REVIEW OF SYSTEMS:     All other systems are negative.    PFSH:    Reviewed      TOBACCO USE:  Social History     Tobacco Use   Smoking Status Never Smoker   Smokeless Tobacco Never Used       VITALS:  Vitals:    10/29/19 1453   BP: 124/76   Patient Site: Left Arm   Patient Position: Sitting   Cuff Size: Adult Regular   Pulse: 60   SpO2: 99%   Weight: 183 lb 12.8 oz (83.4 kg)   Height: 5' 8.5\" (1.74 m)     Wt Readings from Last 3 Encounters:   10/29/19 183 lb 12.8 oz (83.4 kg)   07/31/19 208 lb (94.3 kg)   07/25/19 221 lb (100.2 kg)     Body mass index is 27.54 kg/m .    PHYSICAL EXAM:  Constitutional:  Well appearing patient in no acute distress.  Vitals:  Per nursing notes.    HEENT:  Normocephalic, atraumatic.  Ears are clear bilaterally, with no fluid or redness, and landmarks visible.  Pupils are equal and reactive to light, extraocular muscles intact, visual fields are full.  Nose is normal, and oropharynx is " clear without redness.    Neck is without lymphadenopathy.    Lungs:  Clear to auscultation bilaterally without wheezes, rales or rhonchi.   Cardiac:  Regular rate and rhythm without murmurs, rubs, or gallops.     Legs show no cyanosis, clubbing or edema.  Palpation of the distal pulses are normal and symmetric.    Neurologic:  Cranial nerves II-XII intact.   Normal and symmetric reflexes in extremities, with normal strength and sensation.  Psychiatric:  Mood appropriate, memory intact.         MEDICATIONS:  Current Outpatient Medications   Medication Sig Dispense Refill     PNV/ferrous sulfate/folic acid (PRENATAL VIT-IRON-FOLIC ACID ORAL) Take by mouth.       No current facility-administered medications for this visit.

## 2021-06-03 VITALS
HEART RATE: 60 BPM | DIASTOLIC BLOOD PRESSURE: 76 MMHG | HEIGHT: 69 IN | OXYGEN SATURATION: 99 % | WEIGHT: 183.8 LBS | SYSTOLIC BLOOD PRESSURE: 124 MMHG | BODY MASS INDEX: 27.22 KG/M2

## 2021-06-03 VITALS — HEIGHT: 69 IN | WEIGHT: 221 LBS | BODY MASS INDEX: 32.73 KG/M2

## 2021-06-04 VITALS
BODY MASS INDEX: 24.54 KG/M2 | HEART RATE: 76 BPM | DIASTOLIC BLOOD PRESSURE: 88 MMHG | SYSTOLIC BLOOD PRESSURE: 110 MMHG | WEIGHT: 163.8 LBS

## 2021-06-04 VITALS
SYSTOLIC BLOOD PRESSURE: 117 MMHG | WEIGHT: 168 LBS | DIASTOLIC BLOOD PRESSURE: 80 MMHG | BODY MASS INDEX: 25.17 KG/M2 | TEMPERATURE: 98.2 F | HEART RATE: 86 BPM | OXYGEN SATURATION: 97 % | RESPIRATION RATE: 18 BRPM

## 2021-06-04 VITALS
BODY MASS INDEX: 24.14 KG/M2 | DIASTOLIC BLOOD PRESSURE: 88 MMHG | HEIGHT: 69 IN | SYSTOLIC BLOOD PRESSURE: 124 MMHG | WEIGHT: 163 LBS | HEART RATE: 78 BPM | OXYGEN SATURATION: 100 %

## 2021-06-05 VITALS
DIASTOLIC BLOOD PRESSURE: 84 MMHG | SYSTOLIC BLOOD PRESSURE: 124 MMHG | HEIGHT: 69 IN | HEART RATE: 80 BPM | WEIGHT: 178 LBS | BODY MASS INDEX: 26.36 KG/M2 | RESPIRATION RATE: 16 BRPM

## 2021-06-05 NOTE — PROGRESS NOTES
ASSESSMENT:  1. Palpitations    We will do a bit of a blood work-up today for these palpitations.  We can follow-up with her on the results of the become available.  We can address any abnormalities as they come up.    Interestingly she did have a few irregular beats here today, it seems more so than what was caught on the Holter.  If all of this work-up comes up negative, we may still send her to a cardiologist for evaluation but we will see what happens with the labs first.      - HM1(CBC and Differential)  - Comprehensive Metabolic Panel  - Vitamin B12  - Lyme Antibody Cascade  - HM1 (CBC with Diff)  - Thyroid Stimulating Hormone (TSH)    2. Dizziness    - Vitamin D, Total (25-Hydroxy)  - Erythrocyte Sedimentation Rate  - C-Reactive Protein          PLAN:  There are no Patient Instructions on file for this visit.    Orders Placed This Encounter   Procedures     Comprehensive Metabolic Panel     Vitamin B12     Vitamin D, Total (25-Hydroxy)     Erythrocyte Sedimentation Rate     C-Reactive Protein     Lyme Antibody Cascade     HM1 (CBC with Diff)     Thyroid Stimulating Hormone (TSH)     Medications Discontinued During This Encounter   Medication Reason     albuterol (PROAIR HFA;PROVENTIL HFA;VENTOLIN HFA) 90 mcg/actuation inhaler        No follow-ups on file.    CHIEF COMPLAINT:  Chief Complaint   Patient presents with     Follow-up     Dizziness and review Holter Resuults, Fatigue       HISTORY OF PRESENT ILLNESS:  Vadim is a 34 y.o. female presenting to the clinic today for follow-up.  We did a Holter monitor for some palpitations that the patient was having.  The Holter actually turned up to be pretty normal.  She had a couple of normal PVCs but no runs or couplets.  She did have a couple of times where she felt the symptoms and was noted to have a couple of PVCs at those times but again without any runs it was really called a normal Holter.  She still is having some of these episodes.  She is wondering  "what the next steps might be.  She never gets chest pain or nausea with them.    REVIEW OF SYSTEMS:     All other systems are negative.    PFSH:        TOBACCO USE:  Social History     Tobacco Use   Smoking Status Never Smoker   Smokeless Tobacco Never Used       VITALS:  Vitals:    01/28/20 1513   BP: 124/88   Patient Site: Left Arm   Patient Position: Sitting   Cuff Size: Adult Regular   Pulse: 78   SpO2: 100%   Weight: 163 lb (73.9 kg)   Height: 5' 8.5\" (1.74 m)     Wt Readings from Last 3 Encounters:   01/28/20 163 lb (73.9 kg)   01/17/20 163 lb 12.8 oz (74.3 kg)   01/07/20 168 lb (76.2 kg)     Body mass index is 24.42 kg/m .    PHYSICAL EXAM:  Constitutional:  Well appearing patient in no acute distress.  Vitals:  Per nursing notes.    HEENT:  Normocephalic, atraumatic.  Ears are clear bilaterally, with no fluid or redness, and landmarks visible.  Pupils are equal and reactive to light, extraocular muscles intact, visual fields are full.  Nose is normal, and oropharynx is clear without redness.    Neck is without lymphadenopathy.    Lungs:  Clear to auscultation bilaterally without wheezes, rales or rhonchi.   Cardiac: As I listen to her chest, she does have periods of time where she has some irregularities.  It sounds like she does have some frequent PVCs while I was listening, and then she would have a longer spell where it would be completely normal, and then she would have more of them.  She states that she did feel and did have a little bit of the symptoms that she describes as I was noticing she was having some irregularities.    Legs show no cyanosis, clubbing or edema.  Palpation of the distal pulses are normal and symmetric.    Neurologic:  Cranial nerves II-XII intact.   Normal and symmetric reflexes in extremities, with normal strength and sensation.  Psychiatric:  Mood appropriate, memory intact.         MEDICATIONS:  Current Outpatient Medications   Medication Sig Dispense Refill     PNV/ferrous " sulfate/folic acid (PRENATAL VIT-IRON-FOLIC ACID ORAL) Take by mouth.       No current facility-administered medications for this visit.
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
## 2021-06-05 NOTE — PROGRESS NOTES
ASSESSMENT:  1. Dizziness    - Holter Monitor; Future    2. Palpitations    We can go ahead and get a Holter monitor for her set up with cardiology and evaluate these feelings of different heartbeats and follow-up with her afterwards and the results and make some plans accordingly.  She seemed reassured by the idea of doing this test to rule out any irregularities in her heartbeat.      - Holter Monitor; Future            PLAN:  There are no Patient Instructions on file for this visit.    No orders of the defined types were placed in this encounter.    There are no discontinued medications.    No follow-ups on file.    CHIEF COMPLAINT:  Chief Complaint   Patient presents with     Dizziness     past few months with standing, lower resting heart rate, miss beat then intense pounding sensation       HISTORY OF PRESENT ILLNESS:  Vadim is a 34 y.o. female presenting to the clinic today for some dizziness and palpitations.  She has noticed over the last couple of months that she has had some dizziness and a lower resting heart rate and she thinks that she should have.  She will feel some missed and skipped beats and then a pounding heartbeat after that.  She has not had this looked at or evaluated in the past.  She gets anxious about this and feels a bit dizzy and lightheaded with it.  This happens most every day but varying amounts of time during the day.  There is no history of heart irregularities or family history of heart problems like this.  She denies any true chest pain or tightness.  No shortness of breath noted.  No neck arm jaw or back pain.    REVIEW OF SYSTEMS:     All other systems are negative.    PFSH:    Reviewed      TOBACCO USE:  Social History     Tobacco Use   Smoking Status Never Smoker   Smokeless Tobacco Never Used       VITALS:  Vitals:    01/17/20 1617   BP: 110/88   Pulse: 76   Weight: 163 lb 12.8 oz (74.3 kg)     Wt Readings from Last 3 Encounters:   01/17/20 163 lb 12.8 oz (74.3 kg)    01/07/20 168 lb (76.2 kg)   10/29/19 183 lb 12.8 oz (83.4 kg)     Body mass index is 24.54 kg/m .    PHYSICAL EXAM:  Constitutional:  Well appearing patient in no acute distress.  Vitals:  Per nursing notes.    HEENT:  Normocephalic, atraumatic.  Ears are clear bilaterally, with no fluid or redness, and landmarks visible.  Pupils are equal and reactive to light, extraocular muscles intact, visual fields are full.  Nose is normal, and oropharynx is clear without redness.    Neck is without lymphadenopathy.    Lungs:  Clear to auscultation bilaterally without wheezes, rales or rhonchi.   Cardiac:  Regular rate and rhythm without murmurs, rubs, or gallops.     Legs show no cyanosis, clubbing or edema.  Palpation of the distal pulses are normal and symmetric.        MEDICATIONS:  Current Outpatient Medications   Medication Sig Dispense Refill     PNV/ferrous sulfate/folic acid (PRENATAL VIT-IRON-FOLIC ACID ORAL) Take by mouth.       albuterol (PROAIR HFA;PROVENTIL HFA;VENTOLIN HFA) 90 mcg/actuation inhaler Inhale 2 puffs every 6 (six) hours as needed for wheezing. 1 each 0     No current facility-administered medications for this visit.

## 2021-06-14 NOTE — ANESTHESIA PREPROCEDURE EVALUATION
Anesthesia Evaluation      Patient summary reviewed   No history of anesthetic complications     Airway   Neck ROM: full   Pulmonary - negative ROS and normal exam                          Cardiovascular - normal exam  (+) hypertension, ,      Neuro/Psych - negative ROS     Endo/Other    (+) pregnant     GI/Hepatic/Renal      Comments: Mild thrombocytopenia 143          Dental - normal exam                        Anesthesia Plan  Planned anesthetic: epidural  Discussed rare risks of bleeding, infection, nerve damage, failure and LAST. We discussed risks of headache, failure and low blood pressure. Pt wishes to proceed    ASA 2     Anesthetic plan and risks discussed with: patient and spouse    Post-op plan: epidural analgesia

## 2021-06-14 NOTE — ANESTHESIA PROCEDURE NOTES
Epidural Block    Patient location during procedure: OB  Time Called: 1/15/2021 2:20 PM  Reason for Block:labor epidural  Staffing:  Performing  Anesthesiologist: Milton Cohen MD  Preanesthetic Checklist  Completed: patient identified, risks, benefits, and alternatives discussed, timeout performed, consent obtained, at patient's request, airway assessed, oxygen available, suction available, emergency drugs available and hand hygiene performed  Procedure  Patient position: right lateral decubitus  Prep: ChloraPrep  Patient monitoring: continuous pulse oximetry, heart rate and blood pressure  Approach: midline  Location: L3-L4  Injection technique: LEENA saline  Number of Attempts:1  Needle  Needle type: Sam   Needle gauge: 18 G     Catheter in Space: 5  Assessment  Sensory level:  No complications

## 2021-06-14 NOTE — ANESTHESIA POSTPROCEDURE EVALUATION
Patient: Vadim Atkinson  * No procedures listed *  Anesthesia type: epidural    Patient location: Labor and Delivery  Last vitals: No vitals data found for the desired time range.    Post vital signs: stable  Level of consciousness: awake and responds to simple questions  Post-anesthesia pain: pain controlled  Post-anesthesia nausea and vomiting: no  Pulmonary: unassisted, return to baseline  Cardiovascular: stable and blood pressure at baseline  Hydration: adequate  Anesthetic events: no    QCDR Measures:  ASA# 11 - Regi-op Cardiac Arrest: ASA11B - Patient did NOT experience unanticipated cardiac arrest  ASA# 12 - Regi-op Mortality Rate: ASA12B - Patient did NOT die  ASA# 13 - PACU Re-Intubation Rate: NA - No ETT / LMA used for case  ASA# 10 - Composite Anes Safety: ASA10A - No serious adverse event    Additional Notes:

## 2021-06-16 PROBLEM — Z34.90 PREGNANT: Status: ACTIVE | Noted: 2021-01-15

## 2021-06-16 PROBLEM — I10 HYPERTENSION: Status: ACTIVE | Noted: 2021-01-21

## 2021-06-16 NOTE — PATIENT INSTRUCTIONS - HE
1. Try to keep your sodium intake limited  2. Monitor your blood pressure, restarting anti-hypertension therapy if your systolic blood pressure is consistently above 130  3. Continue your regular exercise regimen, targeting 150 minutes weekly.  4. Call if lightheadedness develops during activities.  Pause with position changes to avoid orthostatic hypotension

## 2021-06-17 NOTE — PATIENT INSTRUCTIONS - HE
Patient Instructions by Bro Vu PA-C at 1/7/2020  9:40 AM     Author: Bro Vu PA-C Service: -- Author Type: Physician Assistant    Filed: 1/7/2020 10:23 AM Encounter Date: 1/7/2020 Status: Addendum    : Bro Vu PA-C (Physician Assistant)    Related Notes: Original Note by Bro Vu PA-C (Physician Assistant) filed at 1/7/2020 10:21 AM       Your rapid influenza test came back negative for flu.  However, you will be treated as if you are positive for the flu.    You are given an antibiotic for covering for bronchitis.    Please contact your child's pediatrician for recommendations about exposure to influenza or influenza-like illness at her age.  Since you are the primary caregiver your child is at risk because of her age.    You are contagious until your fever is gone for 24 hours. Maintain good hand hygiene, cover your cough, and limit contact to prevent spreading the illness. Symptoms typically last 1-2 weeks.    Symptom management:  - Drink plenty of fluids and allow for plenty of rest  - Use tylenol or ibuprofen every 4-6 hours for fever/discomfort    Reasons to be seen immediately for re-evaluation:  - Have trouble breathing or are short of breath  - Feel pain or pressure in your chest or belly  - Get suddenly dizzy  - Feel confused  - Have severe vomiting    If no symptom improvement in 1 week, follow-up with your primary care provider.      Patient Education     Influenza (Adult)    Influenza is also called the flu. It is a viral illness that affects the air passages of your lungs. It is different from the common cold. The flu can easily be passed from one to person to another. It may be spread through the air by coughing and sneezing. Or it can be spread by touching the sick person and then touching your own eyes, nose, or mouth.  The flu starts 1 to 3 days after you are exposed to the flu virus. It may last for 1 to 2 weeks but many people feel tired or fatigued for many weeks  afterward. You usually dont need to take antibiotics unless you have a complication. This might be an ear or sinus infection or pneumonia.  Symptoms of the flu may be mild or severe. They can include extreme tiredness (wanting to stay in bed all day), chills, fevers, muscle aches, soreness with eye movement, headache, and a dry, hacking cough.  Home care  Follow these guidelines when caring for yourself at home:    Avoid being around cigarette smoke, whether yours or other peoples.    Acetaminophen or ibuprofen will help ease your fever, muscle aches, and headache. Dont give aspirin to anyone younger than 18 who has the flu. Aspirin can harm the liver.    Nausea and loss of appetite are common with the flu. Eat light meals. Drink 6 to 8 glasses of liquids every day. Good choices are water, sport drinks, soft drinks without caffeine, juices, tea, and soup. Extra fluids will also help loosen secretions in your nose and lungs.    Over-the-counter cold medicines will not make the flu go away faster. But the medicines may help with coughing, sore throat, and congestion in your nose and sinuses. Dont use a decongestant if you have high blood pressure.    Stay home until your fever has been gone for at least 24 hours without using medicine to reduce fever.  Follow-up care  Follow up with your healthcare provider, or as advised, if you are not getting better over the next week.  If you are age 65 or older, talk with your provider about getting a pneumococcal vaccine every 5 years. You should also get this vaccine if you have chronic asthma or COPD. All adults should get a flu vaccine every fall. Ask your provider about this.  When to seek medical advice  Call your healthcare provider right away if any of these occur:    Cough with lots of colored mucus (sputum) or blood in your mucus    Chest pain, shortness of breath, wheezing, or trouble breathing    Severe headache, or face, neck, or ear pain    New rash with  fever    Fever of 100.4 F (38 C) or higher, or as directed by your healthcare provider    Confusion, behavior change, or seizure    Severe weakness or dizziness    You get a new fever or cough after getting better for a few days  Date Last Reviewed: 1/1/2017 2000-2017 [employer]. [address] [place] [address]. All rights reserved. This information is not intended as a substitute for professional medical care. Always follow your healthcare professional's instructions.

## 2021-06-20 NOTE — LETTER
Letter by Nancy Saez RN at      Author: Nancy Saez RN Service: -- Author Type: --    Filed:  Encounter Date: 6/30/2020 Status: (Other)       6/30/2020        Vadim Atkinson  [address] [facility] [address]    This letter provides a written record that you were tested for COVID-19 on 6/27/20.     Your result was negative. This means that we didnt find the virus that causes COVID-19 in your sample. A test may show negative when you do actually have the virus. This can happen when the virus is in the early stages of infection, before you feel illness symptoms.    If you have symptoms   Stay home and away from others (self-isolate) until you meet ALL of the guidelines below:    Youve had no fever--and no medicine that reduces fever--for 3 full days (72 hours). And ?    Your other symptoms have gotten better. For example, your cough or breathing has improved. And?    At least 10 days have passed since your symptoms started.    During this time:    Stay home. Dont go to work, school or anywhere else.     Stay in your own room, including for meals. Use your own bathroom if you can.    Stay away from others in your home. No hugging, kissing or shaking hands. No visitors.    Clean high touch surfaces often (doorknobs, counters, handles, etc.). Use a household cleaning spray or wipes. You can find a full list on the EPA website at www.epa.gov/pesticide-registration/list-n-disinfectants-use-against-sars-cov-2.    Cover your mouth and nose with a mask, tissue or washcloth to avoid spreading germs.    Wash your hands and face often with soap and water.    Going back to work  Check with your employer for any guidelines to follow for going back to work.    Employers: This document serves as formal notice that your employee tested negative for COVID-19, as of the testing date shown above.

## 2021-06-28 NOTE — PROGRESS NOTES
Progress Notes by Bro Vu PA-C at 1/7/2020  9:40 AM     Author: Bro Vu PA-C Service: -- Author Type: Physician Assistant    Filed: 1/7/2020 11:43 AM Encounter Date: 1/7/2020 Status: Signed    : Bro Vu PA-C (Physician Assistant)       Subjective:      Patient ID: Vadim Atkinson is a 34 y.o. female.    Chief Complaint:    HPI      Vadim Atkinson is a 34 y.o. female who presents today complaining of four day acute onset of Influenza like illness symptoms to include fever, dry nonproductive cough, sore throat, odynophagia, rhinorrhea, myalgias, arthralgias, headache and fatigue.  She also had a right lower quadrant abdominal pain.  She had nausea and some episodes of vomiting but no diarrhea.  She no longer has any right lower quadrant abdominal pain and no vomiting or diarrhea today.  The abdominal pain is stopped over the last 24 hours.    Patient had acute onset of all the above symptoms.    Patient has not had a seasonal influenza immunization.  She works as a  and does have no known sick contacts for influenza.    Last dose of antipyretic.  Tylenol at 9 AM.  Temperature in the office is currently 98.2.    Anorexia: NO    She just recently had a baby and her child is 5 months old at home.    Patient is taking fluids and is micturating.      Past Medical History:   Diagnosis Date   ? Postpartum hypertension 7/31/2019   ? Pre-eclampsia in postpartum period 7/31/2019       No past surgical history on file.    No family history on file.    Social History     Tobacco Use   ? Smoking status: Never Smoker   ? Smokeless tobacco: Never Used   Substance Use Topics   ? Alcohol use: Not Currently   ? Drug use: Never       Review of Systems  As above in HPI, otherwise balance of Review of Systems are negative.    Objective:     /80 (Patient Site: Right Arm, Patient Position: Sitting, Cuff Size: Adult Regular)   Pulse 86   Temp 98.2  F (36.8  C) (Oral)   Resp 18   Wt 168 lb (76.2  kg)   LMP 12/04/2019   SpO2 97%   Breastfeeding No   BMI 25.17 kg/m      Physical Exam  General: Patient is resting comfortably no acute distress is afebrile  HEENT: Head is normocephalic atraumatic   eyes are PERRL EOMI sclera anicteric   TMs are clear bilaterally  Throat is with mild pharyngeal wall drainage  No cervical lymphadenopathy present  LUNGS: Clear to auscultation bilaterally  HEART: Regular rate and rhythm  Abdomen: Patient did not have any pain to palpation at McBurney's point, negative Rovsing sign, negative obturator sign and negative psoas sign.  There was no peritoneal signs with a irritated peritoneum with no rebound no guarding or pain with heel strike.  The rest of the abdomen was soft nontender to palpation.  No suprapubic tenderness to palpation or CVA tenderness to percussion  Skin: Without rash slightly diaphoretic diaphoretic, but no tactile fever appreciated.  Skin is with good turgor capillary refill is less than 2 seconds and oral mucous membranes are moist    Lab:  Recent Results (from the past 24 hour(s))   Influenza A/B Rapid Test- Nasal Swab   Result Value Ref Range    Influenza  A, Rapid Antigen No Influenza A antigen detected No Influenza A antigen detected    Influenza B, Rapid Antigen No Influenza B antigen detected No Influenza B antigen detected       Assessment:     Procedures    The primary encounter diagnosis was Influenza-like illness. A diagnosis of Cough was also pertinent to this visit.    Plan:     1. Influenza-like illness  albuterol (PROAIR HFA;PROVENTIL HFA;VENTOLIN HFA) 90 mcg/actuation inhaler    azithromycin (ZITHROMAX) 500 MG tablet   2. Cough  Influenza A/B Rapid Test- Nasal Swab    albuterol (PROAIR HFA;PROVENTIL HFA;VENTOLIN HFA) 90 mcg/actuation inhaler    azithromycin (ZITHROMAX) 500 MG tablet       Multiple etiologies and diagnoses were considered to include, but not limited to, the flu, strep throat, viral respiratory illness, mesenteric lymphadenitis  and appendicitis.    Had a long conversation with the patient stating that the influenza test is not perfect.  Because she has quite a bit of symptoms for for the flu and is at a 6 I opted not to treat her with an antiviral.  However she has had quite a bit of cough sweating and fatigue.  Will cover her with a respiratory antibiotic for any sequela of pneumonia.  She may choose to hold onto the prescription for the next 2 days and see how her symptoms are progressing.  Additionally she will monitor for right lower quadrant abdominal pain.  We did discuss the possibility of appendicitis based on her symptoms.  Discussion did focus on the difficulty of differentiating flulike symptoms with appendicitis.  She did not have diarrhea but had some nauseousness and one bout of emesis but this has not continued.  In fact she has improved with her abdominal pain.  She will monitor and follow with tincture of time and see how her symptoms go for both the flu and right lower quadrant abdominal pain.  This concern was discussed with the patient and she voiced understanding of those concerns and indications for return.    Patient Instructions   Your rapid influenza test came back negative for flu.  However, you will be treated as if you are positive for the flu.    You are given an antibiotic for covering for bronchitis.    Please contact your child's pediatrician for recommendations about exposure to influenza or influenza-like illness at her age.  Since you are the primary caregiver your child is at risk because of her age.    You are contagious until your fever is gone for 24 hours. Maintain good hand hygiene, cover your cough, and limit contact to prevent spreading the illness. Symptoms typically last 1-2 weeks.    Symptom management:  - Drink plenty of fluids and allow for plenty of rest  - Use tylenol or ibuprofen every 4-6 hours for fever/discomfort    Reasons to be seen immediately for re-evaluation:  - Have trouble  breathing or are short of breath  - Feel pain or pressure in your chest or belly  - Get suddenly dizzy  - Feel confused  - Have severe vomiting    If no symptom improvement in 1 week, follow-up with your primary care provider.      Patient Education     Influenza (Adult)    Influenza is also called the flu. It is a viral illness that affects the air passages of your lungs. It is different from the common cold. The flu can easily be passed from one to person to another. It may be spread through the air by coughing and sneezing. Or it can be spread by touching the sick person and then touching your own eyes, nose, or mouth.  The flu starts 1 to 3 days after you are exposed to the flu virus. It may last for 1 to 2 weeks but many people feel tired or fatigued for many weeks afterward. You usually dont need to take antibiotics unless you have a complication. This might be an ear or sinus infection or pneumonia.  Symptoms of the flu may be mild or severe. They can include extreme tiredness (wanting to stay in bed all day), chills, fevers, muscle aches, soreness with eye movement, headache, and a dry, hacking cough.  Home care  Follow these guidelines when caring for yourself at home:    Avoid being around cigarette smoke, whether yours or other peoples.    Acetaminophen or ibuprofen will help ease your fever, muscle aches, and headache. Dont give aspirin to anyone younger than 18 who has the flu. Aspirin can harm the liver.    Nausea and loss of appetite are common with the flu. Eat light meals. Drink 6 to 8 glasses of liquids every day. Good choices are water, sport drinks, soft drinks without caffeine, juices, tea, and soup. Extra fluids will also help loosen secretions in your nose and lungs.    Over-the-counter cold medicines will not make the flu go away faster. But the medicines may help with coughing, sore throat, and congestion in your nose and sinuses. Dont use a decongestant if you have high blood  pressure.    Stay home until your fever has been gone for at least 24 hours without using medicine to reduce fever.  Follow-up care  Follow up with your healthcare provider, or as advised, if you are not getting better over the next week.  If you are age 65 or older, talk with your provider about getting a pneumococcal vaccine every 5 years. You should also get this vaccine if you have chronic asthma or COPD. All adults should get a flu vaccine every fall. Ask your provider about this.  When to seek medical advice  Call your healthcare provider right away if any of these occur:    Cough with lots of colored mucus (sputum) or blood in your mucus    Chest pain, shortness of breath, wheezing, or trouble breathing    Severe headache, or face, neck, or ear pain    New rash with fever    Fever of 100.4 F (38 C) or higher, or as directed by your healthcare provider    Confusion, behavior change, or seizure    Severe weakness or dizziness    You get a new fever or cough after getting better for a few days  Date Last Reviewed: 1/1/2017 2000-2017 [employer] [address] [place] [address], [place] [address]. All rights reserved. This information is not intended as a substitute for professional medical care. Always follow your healthcare professional's instructions.

## 2021-06-30 NOTE — PROGRESS NOTES
Progress Notes by Behzad Pyle MD at 3/22/2021  1:50 PM     Author: Behzad Pyle MD Service: -- Author Type: Physician    Filed: 3/22/2021  2:45 PM Encounter Date: 3/22/2021 Status: Signed    : Behzad Pyle MD (Physician)         CARDIOLOGY CLINIC CONSULT NOTE     Assessment/Plan:   1.  Preeclampsia.  We discussed that this is likely to recur with future pregnancies at increased frequency.  We also discussed that this makes it more likely that she will develop hypertension later in life.  It is not clear whether the preeclampsia is causative of future essential hypertension, however.  Discussed this difference.  2.  Orthostatic lightheadedness.  We discussed its transient nature, and benign prognosis.  She was advised to pause when standing quickly prior to initiating activity.  3.  Essential hypertension.  She was advised to monitor her blood pressure going forward, initiating pharmacologic treatment when blood pressure was consistently elevated.    Follow-up as needed     History of Present Illness:     It is my pleasure to see Vadim Atkinson at the [facility] clinic for evaluation of hypertension.    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  She was placed on labetalol, which was subsequently discontinued because of orthostatic lightheadedness.  She presents today for further evaluation.    He has noticed occasional lightheadedness with standing this is transient lasting 10 seconds or less.  She has occasionally had to sit down with this, but has had no falls.  She recalls experiencing this after her first pregnancy but it gradually resolved.  She has remained active, running 2 to 6 miles 3 days a week stable activity tolerance.  She does work to try and keep yourself well-hydrated.  She generally  sleeps well, awakens refreshed.  Her weight is up about 15 pounds from a year ago.    Has noticed since her admission in January that her heart rate is frequently in the 30s to 40s overnight according to her apple watch.  It does increase normally with activities during the daytime.  Her stamina is stable.  She has had occasional brief palpitations described as a pounding regular sensation.  These are occasionally associated with the dizzy spells when she stands quickly.  She drinks an alcoholic beverage 3-5 times a week.  She has had no syncope or falls.    Past Medical History:     Patient Active Problem List   Diagnosis   ? Acne   ? Pregnant   ? Preeclampsia in postpartum period   ? Hypertension       Past Surgical History:   History reviewed. No pertinent surgical history.    Family History:   History reviewed. No pertinent family history.  Family history reviewed and is not pertinent to the chief complaint or presenting problem    Social History:    reports that she has never smoked. She has never used smokeless tobacco. She reports previous alcohol use. She reports that she does not use drugs.    Exercise: Lifts weights, runs 2 to 6 miles 3 days a week, rides a bike    Sleep: Restorative, no snoring.  New baby now sleeping through the night    Meds:     Current Outpatient Medications   Medication Sig Dispense Refill   ? aspirin 81 MG EC tablet Take 1 tablet by mouth at bedtime.     ? docosahexaenoic acid/epa (FISH OIL ORAL) Take 1 capsule by mouth 3 (three) times a day.     ? PNV/ferrous sulfate/folic acid (PRENATAL VIT-IRON-FOLIC ACID ORAL) Take 1 tablet by mouth at bedtime.      ? cholecalciferol, vitamin D3, (VITAMIN D3 ORAL) Take 6,000 Units by mouth every evening.      ? docusate sodium (COLACE) 100 MG capsule Take 1 capsule (100 mg total) by mouth daily.  0   ? ibuprofen (ADVIL,MOTRIN) 800 MG tablet Take 1 tablet (800 mg total) by mouth every 6 (six) hours as needed. 30 tablet 0   ? labetaloL (NORMODYNE)  "200 MG tablet Take 1 tablet (200 mg total) by mouth 3 (three) times a day. 60 tablet 0     No current facility-administered medications for this visit.        Allergies:   Sulfa (sulfonamide antibiotics)    Review of Systems:     General: Night Sweats  Eyes: Visual Distubance  Ears/Nose/Throat: WNL  Lungs: Shortness of Breath  Heart: Irregular Heartbeat  Stomach: WNL  Bladder: WNL  Muscle/Joints: WNL  Skin: WNL  Nervous System: Daytime Sleepiness, Dizziness  Mental Health: WNL     Blood: WNL        Objective:      Physical Exam  178 lb (80.7 kg)  5' 9\" (1.753 m)  Body mass index is 26.29 kg/m .  /84 (Patient Site: Left Arm, Patient Position: Sitting, Cuff Size: Adult Regular)   Pulse 80   Resp 16   Ht 5' 9\" (1.753 m)   Wt 178 lb (80.7 kg)   LMP 12/04/2019 (Exact Date)   BMI 26.29 kg/m          General Appearance : Awake, Alert, No acute distress  HEENT: No Scleral icterus; the mucous membranes were pink and moist.  Conjunctivae not injected  Neck:  No cervical bruits, jugular venous distention, or thyromegaly   Chest: The spine was straight. Chest wall symmetric  Lungs: Respirations unlabored; the lungs are clear to auscultation.  No wheezing   Cardiovascular:   Normal point of maximal impulse.  Auscultation reveals normal first and second heart sounds with no murmurs, rubs, or gallops.  Carotid, radial, and dorsalis pedal pulses are intact and symmetric.  Abdomen: No organomegaly, masses, bruits, or tenderness. Bowels sounds are present  Extremities: No edema  Skin: No xanthelasma. Warm, Dry.  Musculoskeletal: No tenderness.  Neurologic: Alert and oriented ×3. Speech is fluent.      EKG (personally reviewed):  1/21/2021: Sinus rhythm with sinus arrhythmia at 62 bpm.  Otherwise normal ECG.    Cardiac Imaging Studies:  Holter monitor 1/2020: Normal    Lab Review   Lab Results   Component Value Date     01/21/2021    K 3.4 (L) 01/21/2021     01/21/2021    CO2 22 01/21/2021    BUN 16 01/21/2021 "    CREATININE 0.80 01/21/2021    CALCIUM 9.1 01/21/2021     Lab Results   Component Value Date    WBC 9.9 01/21/2021    HGB 14.1 01/21/2021    HCT 39.3 01/21/2021    MCV 89 01/21/2021     01/21/2021     Lab Results   Component Value Date    CHOL 240 (H) 10/29/2019    TRIG 58 10/29/2019    HDL 71 10/29/2019    LDLCALC 157 (H) 10/29/2019     No results found for: TROPONINI  No results found for: BNP  Lab Results   Component Value Date    TSH 1.58 01/28/2020       Behzad Pyle MD [facility]      This note created using Dragon voice recognition software. Sound alike errors may have escaped editing.   53 minutes spent in preparation, face-to-face time, and formulation of a plan.

## 2021-07-03 NOTE — ADDENDUM NOTE
Addendum Note by Santos Allred MD at 1/28/2020  3:20 PM     Author: Santos Allred MD Service: -- Author Type: Physician    Filed: 1/30/2020 11:11 AM Encounter Date: 1/28/2020 Status: Signed    : Santos Allred MD (Physician)    Addended by: SANTOS ALLRED on: 1/30/2020 11:11 AM        Modules accepted: Orders

## 2021-07-14 PROBLEM — O13.3 GESTATIONAL (PREGNANCY-INDUCED) HYPERTENSION WITHOUT SIGNIFICANT PROTEINURIA, THIRD TRIMESTER: Status: RESOLVED | Noted: 2019-07-25 | Resolved: 2019-11-01

## 2021-07-14 PROBLEM — Z34.90 PREGNANT: Status: RESOLVED | Noted: 2019-07-25 | Resolved: 2019-10-29

## 2021-07-14 PROBLEM — O09.40 GESTATIONAL HYPERTENSION AFFECTING EIGHTH PREGNANCY: Status: RESOLVED | Noted: 2019-07-25 | Resolved: 2019-10-29

## 2021-07-14 PROBLEM — O13.9 GESTATIONAL HYPERTENSION AFFECTING EIGHTH PREGNANCY: Status: RESOLVED | Noted: 2019-07-25 | Resolved: 2019-10-29

## 2021-08-02 ENCOUNTER — TRANSFERRED RECORDS (OUTPATIENT)
Dept: HEALTH INFORMATION MANAGEMENT | Facility: CLINIC | Age: 36
End: 2021-08-02

## 2021-08-15 ENCOUNTER — HEALTH MAINTENANCE LETTER (OUTPATIENT)
Age: 36
End: 2021-08-15

## 2021-10-10 ENCOUNTER — HEALTH MAINTENANCE LETTER (OUTPATIENT)
Age: 36
End: 2021-10-10

## 2022-08-09 ENCOUNTER — TRANSFERRED RECORDS (OUTPATIENT)
Dept: HEALTH INFORMATION MANAGEMENT | Facility: CLINIC | Age: 37
End: 2022-08-09

## 2022-09-24 ENCOUNTER — HEALTH MAINTENANCE LETTER (OUTPATIENT)
Age: 37
End: 2022-09-24

## 2022-10-25 ENCOUNTER — APPOINTMENT (OUTPATIENT)
Dept: ULTRASOUND IMAGING | Facility: CLINIC | Age: 37
End: 2022-10-25
Attending: ADVANCED PRACTICE MIDWIFE
Payer: COMMERCIAL

## 2022-10-25 ENCOUNTER — HOSPITAL ENCOUNTER (OUTPATIENT)
Facility: CLINIC | Age: 37
Discharge: HOME | End: 2022-10-25
Attending: ADVANCED PRACTICE MIDWIFE | Admitting: ADVANCED PRACTICE MIDWIFE
Payer: COMMERCIAL

## 2022-10-25 PROCEDURE — G0463 HOSPITAL OUTPT CLINIC VISIT: HCPCS

## 2022-10-25 PROCEDURE — 76819 FETAL BIOPHYS PROFIL W/O NST: CPT

## 2022-10-25 RX ORDER — LIDOCAINE 40 MG/G
CREAM TOPICAL
Status: DISCONTINUED | OUTPATIENT
Start: 2022-10-25 | End: 2022-10-25 | Stop reason: HOSPADM

## 2022-10-25 ASSESSMENT — ACTIVITIES OF DAILY LIVING (ADL): ADLS_ACUITY_SCORE: 33

## 2022-10-25 NOTE — PLAN OF CARE
Patient sent from office for extended monitoring do to questionable de olamide.  Baby is category 1 tracing called Maggie Griggs to notify her that baby had bpp of 8/8 also.  She is good with sending patient home if she is comfortable with that plan and she is good

## 2022-10-25 NOTE — PROGRESS NOTES
"Outpatient/Triage Note:    Patient Name:  Vadim Atkinson  :      1985  MRN:      [id number]    Subjective:  Vadim Atkinson is a 37 year old  at 38.3 weeks, who presented to [facility] for evaluation of failed NST in clinic. Denies leaking of fluid, bleeding, or changes in fetal movement. NST was originally done in clinic due to patient feeling like something was \"wrong\". In clinic, had 1 prolonged decel with a contraction and then 1 subsequent variable noted by clinic CNM Lynette.     Objective:  Vital signs: There were no vitals taken for this visit.   BPP 8/8, SDP WNL.   FHR: Category 1, baseline 150, moderate variability, accelerations +, decelerations absent  Uterine contractions: 1 noted, no decel after.     Physical Exam: A&Ox3  Abdomen: SIUP, abdomen non-tender  SVE: deferred    Results:  No results found for this or any previous visit (from the past 168 hour(s)).    Assessment:   @ 38w3d here for evaluation of failed NST in clinic.     Plan:   -BPP 8/8, SDP WNL, NST reactive. Reassured. Pt feels comfortable discharging.   - Discharge to home undelivered. Reviewed warning signs including decreased fetal movement, leaking of fluid, vaginal bleeding, or signs of labor. Reviewed how to contact on-call provider. Follow-up in clinic with OB provider as scheduled or sooner as needed. All questions answered. Agrees with plan.     Provider: NAFISA Almanza CNM     "

## 2022-11-01 ENCOUNTER — ANESTHESIA EVENT (OUTPATIENT)
Dept: OBGYN | Facility: CLINIC | Age: 37
End: 2022-11-01
Payer: COMMERCIAL

## 2022-11-01 ENCOUNTER — HOSPITAL ENCOUNTER (INPATIENT)
Facility: CLINIC | Age: 37
LOS: 2 days | Discharge: HOME | End: 2022-11-03
Attending: ADVANCED PRACTICE MIDWIFE | Admitting: ADVANCED PRACTICE MIDWIFE
Payer: COMMERCIAL

## 2022-11-01 ENCOUNTER — ANESTHESIA (OUTPATIENT)
Dept: OBGYN | Facility: CLINIC | Age: 37
End: 2022-11-01
Payer: COMMERCIAL

## 2022-11-01 PROBLEM — Z34.90 ENCOUNTER FOR INDUCTION OF LABOR: Status: ACTIVE | Noted: 2022-11-01

## 2022-11-01 LAB
ABO/RH(D): NORMAL
ALBUMIN MFR UR ELPH: <7 MG/DL
ALT SERPL W P-5'-P-CCNC: 14 U/L (ref 0–45)
ANTIBODY SCREEN: NEGATIVE
AST SERPL W P-5'-P-CCNC: 18 U/L (ref 0–40)
CREAT SERPL-MCNC: 0.66 MG/DL (ref 0.6–1.1)
CREAT UR-MCNC: 31 MG/DL
ERYTHROCYTE [DISTWIDTH] IN BLOOD BY AUTOMATED COUNT: 12.5 % (ref 10–15)
GFR SERPL CREATININE-BSD FRML MDRD: >90 ML/MIN/1.73M2
HCT VFR BLD AUTO: 35.4 % (ref 35–47)
HGB BLD-MCNC: 12 G/DL (ref 11.7–15.7)
MCH RBC QN AUTO: 29.8 PG (ref 26.5–33)
MCHC RBC AUTO-ENTMCNC: 33.9 G/DL (ref 31.5–36.5)
MCV RBC AUTO: 88 FL (ref 78–100)
PLATELET # BLD AUTO: 113 10E3/UL (ref 150–450)
PLATELET # BLD AUTO: 129 10E3/UL (ref 150–450)
PROT/CREAT 24H UR: NORMAL MG/G{CREAT}
RBC # BLD AUTO: 4.03 10E6/UL (ref 3.8–5.2)
SARS-COV-2 RNA RESP QL NAA+PROBE: NEGATIVE
SPECIMEN EXPIRATION DATE: NORMAL
T PALLIDUM AB SER QL: NONREACTIVE
URATE SERPL-MCNC: 4.3 MG/DL (ref 2–7.5)
WBC # BLD AUTO: 7.6 10E3/UL (ref 4–11)

## 2022-11-01 PROCEDURE — 84156 ASSAY OF PROTEIN URINE: CPT | Performed by: ADVANCED PRACTICE MIDWIFE

## 2022-11-01 PROCEDURE — 84460 ALANINE AMINO (ALT) (SGPT): CPT | Performed by: ADVANCED PRACTICE MIDWIFE

## 2022-11-01 PROCEDURE — 3E0P7VZ INTRODUCTION OF HORMONE INTO FEMALE REPRODUCTIVE, VIA NATURAL OR ARTIFICIAL OPENING: ICD-10-PCS | Performed by: ADVANCED PRACTICE MIDWIFE

## 2022-11-01 PROCEDURE — 250N000009 HC RX 250: Performed by: ADVANCED PRACTICE MIDWIFE

## 2022-11-01 PROCEDURE — 120N000001 HC R&B MED SURG/OB

## 2022-11-01 PROCEDURE — 250N000011 HC RX IP 250 OP 636: Performed by: ANESTHESIOLOGY

## 2022-11-01 PROCEDURE — 85049 AUTOMATED PLATELET COUNT: CPT | Performed by: ADVANCED PRACTICE MIDWIFE

## 2022-11-01 PROCEDURE — 82565 ASSAY OF CREATININE: CPT | Performed by: ADVANCED PRACTICE MIDWIFE

## 2022-11-01 PROCEDURE — U0005 INFEC AGEN DETEC AMPLI PROBE: HCPCS | Performed by: ADVANCED PRACTICE MIDWIFE

## 2022-11-01 PROCEDURE — 85018 HEMOGLOBIN: CPT | Performed by: ADVANCED PRACTICE MIDWIFE

## 2022-11-01 PROCEDURE — 86901 BLOOD TYPING SEROLOGIC RH(D): CPT | Performed by: ADVANCED PRACTICE MIDWIFE

## 2022-11-01 PROCEDURE — 86780 TREPONEMA PALLIDUM: CPT | Performed by: ADVANCED PRACTICE MIDWIFE

## 2022-11-01 PROCEDURE — 84550 ASSAY OF BLOOD/URIC ACID: CPT | Performed by: ADVANCED PRACTICE MIDWIFE

## 2022-11-01 PROCEDURE — 84450 TRANSFERASE (AST) (SGOT): CPT | Performed by: ADVANCED PRACTICE MIDWIFE

## 2022-11-01 PROCEDURE — 258N000003 HC RX IP 258 OP 636: Performed by: ADVANCED PRACTICE MIDWIFE

## 2022-11-01 PROCEDURE — C9803 HOPD COVID-19 SPEC COLLECT: HCPCS

## 2022-11-01 PROCEDURE — 00HU33Z INSERTION OF INFUSION DEVICE INTO SPINAL CANAL, PERCUTANEOUS APPROACH: ICD-10-PCS | Performed by: ANESTHESIOLOGY

## 2022-11-01 PROCEDURE — 370N000003 HC ANESTHESIA WARD SERVICE

## 2022-11-01 PROCEDURE — 86850 RBC ANTIBODY SCREEN: CPT | Performed by: ADVANCED PRACTICE MIDWIFE

## 2022-11-01 PROCEDURE — 3E0R3BZ INTRODUCTION OF ANESTHETIC AGENT INTO SPINAL CANAL, PERCUTANEOUS APPROACH: ICD-10-PCS | Performed by: ANESTHESIOLOGY

## 2022-11-01 PROCEDURE — 250N000013 HC RX MED GY IP 250 OP 250 PS 637: Performed by: ADVANCED PRACTICE MIDWIFE

## 2022-11-01 PROCEDURE — 36415 COLL VENOUS BLD VENIPUNCTURE: CPT | Performed by: ADVANCED PRACTICE MIDWIFE

## 2022-11-01 RX ORDER — KETOROLAC TROMETHAMINE 30 MG/ML
30 INJECTION, SOLUTION INTRAMUSCULAR; INTRAVENOUS
Status: DISCONTINUED | OUTPATIENT
Start: 2022-11-01 | End: 2022-11-03 | Stop reason: HOSPADM

## 2022-11-01 RX ORDER — NALOXONE HYDROCHLORIDE 0.4 MG/ML
0.4 INJECTION, SOLUTION INTRAMUSCULAR; INTRAVENOUS; SUBCUTANEOUS
Status: DISCONTINUED | OUTPATIENT
Start: 2022-11-01 | End: 2022-11-02 | Stop reason: HOSPADM

## 2022-11-01 RX ORDER — OXYTOCIN/0.9 % SODIUM CHLORIDE 30/500 ML
340 PLASTIC BAG, INJECTION (ML) INTRAVENOUS CONTINUOUS PRN
Status: DISCONTINUED | OUTPATIENT
Start: 2022-11-01 | End: 2022-11-02 | Stop reason: HOSPADM

## 2022-11-01 RX ORDER — METOCLOPRAMIDE HYDROCHLORIDE 5 MG/ML
10 INJECTION INTRAMUSCULAR; INTRAVENOUS EVERY 6 HOURS PRN
Status: DISCONTINUED | OUTPATIENT
Start: 2022-11-01 | End: 2022-11-02 | Stop reason: HOSPADM

## 2022-11-01 RX ORDER — FENTANYL/ROPIVACAINE/NS/PF 2MCG/ML-.1
PLASTIC BAG, INJECTION (ML) EPIDURAL
Status: DISCONTINUED | OUTPATIENT
Start: 2022-11-01 | End: 2022-11-02 | Stop reason: HOSPADM

## 2022-11-01 RX ORDER — HYDROXYZINE HYDROCHLORIDE 25 MG/1
100 TABLET, FILM COATED ORAL
Status: DISCONTINUED | OUTPATIENT
Start: 2022-11-01 | End: 2022-11-02 | Stop reason: HOSPADM

## 2022-11-01 RX ORDER — METOCLOPRAMIDE 10 MG/1
10 TABLET ORAL EVERY 6 HOURS PRN
Status: DISCONTINUED | OUTPATIENT
Start: 2022-11-01 | End: 2022-11-02 | Stop reason: HOSPADM

## 2022-11-01 RX ORDER — MISOPROSTOL 100 UG/1
25 TABLET ORAL
Status: DISCONTINUED | OUTPATIENT
Start: 2022-11-01 | End: 2022-11-02 | Stop reason: HOSPADM

## 2022-11-01 RX ORDER — ONDANSETRON 4 MG/1
4 TABLET, ORALLY DISINTEGRATING ORAL EVERY 6 HOURS PRN
Status: DISCONTINUED | OUTPATIENT
Start: 2022-11-01 | End: 2022-11-02 | Stop reason: HOSPADM

## 2022-11-01 RX ORDER — FENTANYL CITRATE 50 UG/ML
100 INJECTION, SOLUTION INTRAMUSCULAR; INTRAVENOUS
Status: DISCONTINUED | OUTPATIENT
Start: 2022-11-01 | End: 2022-11-02 | Stop reason: HOSPADM

## 2022-11-01 RX ORDER — PROCHLORPERAZINE MALEATE 10 MG
10 TABLET ORAL EVERY 6 HOURS PRN
Status: DISCONTINUED | OUTPATIENT
Start: 2022-11-01 | End: 2022-11-02 | Stop reason: HOSPADM

## 2022-11-01 RX ORDER — BUPIVACAINE HYDROCHLORIDE 2.5 MG/ML
INJECTION, SOLUTION EPIDURAL; INFILTRATION; INTRACAUDAL
Status: COMPLETED | OUTPATIENT
Start: 2022-11-01 | End: 2022-11-02

## 2022-11-01 RX ORDER — LIDOCAINE 40 MG/G
CREAM TOPICAL
Status: DISCONTINUED | OUTPATIENT
Start: 2022-11-01 | End: 2022-11-02 | Stop reason: HOSPADM

## 2022-11-01 RX ORDER — MISOPROSTOL 200 UG/1
400 TABLET ORAL
Status: DISCONTINUED | OUTPATIENT
Start: 2022-11-01 | End: 2022-11-02 | Stop reason: HOSPADM

## 2022-11-01 RX ORDER — FENTANYL CITRATE-0.9 % NACL/PF 10 MCG/ML
100 PLASTIC BAG, INJECTION (ML) INTRAVENOUS EVERY 5 MIN PRN
Status: DISCONTINUED | OUTPATIENT
Start: 2022-11-01 | End: 2022-11-02 | Stop reason: HOSPADM

## 2022-11-01 RX ORDER — NALOXONE HYDROCHLORIDE 0.4 MG/ML
0.2 INJECTION, SOLUTION INTRAMUSCULAR; INTRAVENOUS; SUBCUTANEOUS
Status: DISCONTINUED | OUTPATIENT
Start: 2022-11-01 | End: 2022-11-02 | Stop reason: HOSPADM

## 2022-11-01 RX ORDER — MORPHINE SULFATE 10 MG/ML
10 INJECTION, SOLUTION INTRAMUSCULAR; INTRAVENOUS
Status: DISCONTINUED | OUTPATIENT
Start: 2022-11-01 | End: 2022-11-02 | Stop reason: HOSPADM

## 2022-11-01 RX ORDER — OXYTOCIN/0.9 % SODIUM CHLORIDE 30/500 ML
100-340 PLASTIC BAG, INJECTION (ML) INTRAVENOUS CONTINUOUS PRN
Status: DISCONTINUED | OUTPATIENT
Start: 2022-11-01 | End: 2022-11-03 | Stop reason: HOSPADM

## 2022-11-01 RX ORDER — OXYTOCIN 10 [USP'U]/ML
10 INJECTION, SOLUTION INTRAMUSCULAR; INTRAVENOUS
Status: DISCONTINUED | OUTPATIENT
Start: 2022-11-01 | End: 2022-11-02 | Stop reason: HOSPADM

## 2022-11-01 RX ORDER — NALBUPHINE HYDROCHLORIDE 10 MG/ML
2.5-5 INJECTION, SOLUTION INTRAMUSCULAR; INTRAVENOUS; SUBCUTANEOUS EVERY 6 HOURS PRN
Status: DISCONTINUED | OUTPATIENT
Start: 2022-11-01 | End: 2022-11-03 | Stop reason: HOSPADM

## 2022-11-01 RX ORDER — CARBOPROST TROMETHAMINE 250 UG/ML
250 INJECTION, SOLUTION INTRAMUSCULAR
Status: DISCONTINUED | OUTPATIENT
Start: 2022-11-01 | End: 2022-11-02 | Stop reason: HOSPADM

## 2022-11-01 RX ORDER — SODIUM CHLORIDE, SODIUM LACTATE, POTASSIUM CHLORIDE, CALCIUM CHLORIDE 600; 310; 30; 20 MG/100ML; MG/100ML; MG/100ML; MG/100ML
INJECTION, SOLUTION INTRAVENOUS CONTINUOUS PRN
Status: DISCONTINUED | OUTPATIENT
Start: 2022-11-01 | End: 2022-11-02 | Stop reason: HOSPADM

## 2022-11-01 RX ORDER — METHYLERGONOVINE MALEATE 0.2 MG/ML
200 INJECTION INTRAVENOUS
Status: DISCONTINUED | OUTPATIENT
Start: 2022-11-01 | End: 2022-11-02 | Stop reason: HOSPADM

## 2022-11-01 RX ORDER — IBUPROFEN 800 MG/1
800 TABLET, FILM COATED ORAL
Status: DISCONTINUED | OUTPATIENT
Start: 2022-11-01 | End: 2022-11-03 | Stop reason: HOSPADM

## 2022-11-01 RX ORDER — OXYTOCIN 10 [USP'U]/ML
10 INJECTION, SOLUTION INTRAMUSCULAR; INTRAVENOUS
Status: DISCONTINUED | OUTPATIENT
Start: 2022-11-01 | End: 2022-11-03 | Stop reason: HOSPADM

## 2022-11-01 RX ORDER — OXYTOCIN/0.9 % SODIUM CHLORIDE 30/500 ML
1-24 PLASTIC BAG, INJECTION (ML) INTRAVENOUS CONTINUOUS
Status: DISCONTINUED | OUTPATIENT
Start: 2022-11-01 | End: 2022-11-02 | Stop reason: HOSPADM

## 2022-11-01 RX ORDER — MISOPROSTOL 200 UG/1
800 TABLET ORAL
Status: DISCONTINUED | OUTPATIENT
Start: 2022-11-01 | End: 2022-11-02 | Stop reason: HOSPADM

## 2022-11-01 RX ORDER — PROCHLORPERAZINE 25 MG
25 SUPPOSITORY, RECTAL RECTAL EVERY 12 HOURS PRN
Status: DISCONTINUED | OUTPATIENT
Start: 2022-11-01 | End: 2022-11-02 | Stop reason: HOSPADM

## 2022-11-01 RX ORDER — CITRIC ACID/SODIUM CITRATE 334-500MG
30 SOLUTION, ORAL ORAL
Status: DISCONTINUED | OUTPATIENT
Start: 2022-11-01 | End: 2022-11-02 | Stop reason: HOSPADM

## 2022-11-01 RX ORDER — ONDANSETRON 2 MG/ML
4 INJECTION INTRAMUSCULAR; INTRAVENOUS EVERY 6 HOURS PRN
Status: DISCONTINUED | OUTPATIENT
Start: 2022-11-01 | End: 2022-11-02 | Stop reason: HOSPADM

## 2022-11-01 RX ADMIN — BUPIVACAINE HYDROCHLORIDE 8 ML: 2.5 INJECTION, SOLUTION EPIDURAL; INFILTRATION; INTRACAUDAL at 21:50

## 2022-11-01 RX ADMIN — Medication: at 21:48

## 2022-11-01 RX ADMIN — SODIUM CHLORIDE, POTASSIUM CHLORIDE, SODIUM LACTATE AND CALCIUM CHLORIDE 500 ML: 600; 310; 30; 20 INJECTION, SOLUTION INTRAVENOUS at 21:05

## 2022-11-01 RX ADMIN — MISOPROSTOL 25 MCG: 100 TABLET ORAL at 18:02

## 2022-11-01 RX ADMIN — Medication 2 MILLI-UNITS/MIN: at 22:40

## 2022-11-01 RX ADMIN — MISOPROSTOL 25 MCG: 100 TABLET ORAL at 11:46

## 2022-11-01 RX ADMIN — MISOPROSTOL 25 MCG: 100 TABLET ORAL at 13:46

## 2022-11-01 RX ADMIN — MISOPROSTOL 25 MCG: 100 TABLET ORAL at 15:48

## 2022-11-01 ASSESSMENT — ACTIVITIES OF DAILY LIVING (ADL)
DIFFICULTY_EATING/SWALLOWING: NO
ADLS_ACUITY_SCORE: 18
WALKING_OR_CLIMBING_STAIRS_DIFFICULTY: NO
ADLS_ACUITY_SCORE: 18
ADLS_ACUITY_SCORE: 18
ADLS_ACUITY_SCORE: 20
ADLS_ACUITY_SCORE: 31
DRESSING/BATHING_DIFFICULTY: NO
ADLS_ACUITY_SCORE: 18
CHANGE_IN_FUNCTIONAL_STATUS_SINCE_ONSET_OF_CURRENT_ILLNESS/INJURY: NO
FALL_HISTORY_WITHIN_LAST_SIX_MONTHS: NO
WEAR_GLASSES_OR_BLIND: NO
ADLS_ACUITY_SCORE: 18
TOILETING_ISSUES: NO
CONCENTRATING,_REMEMBERING_OR_MAKING_DECISIONS_DIFFICULTY: NO
DIFFICULTY_COMMUNICATING: NO
DOING_ERRANDS_INDEPENDENTLY_DIFFICULTY: NO

## 2022-11-01 NOTE — PLAN OF CARE
Problem: Plan of Care - These are the overarching goals to be used throughout the patient stay.    Goal: Plan of Care Review  Description: The Plan of Care Review/Shift note should be completed every shift.  The Outcome Evaluation is a brief statement about your assessment that the patient is improving, declining, or no change.  This information will be displayed automatically on your shift note.  Flowsheets (Taken 11/1/2022 1225)  Plan of Care Reviewed With: patient  Overall Patient Progress: improving   Plan for cervical ripening and induction of labor

## 2022-11-01 NOTE — H&P
HISTORY AND PHYSICAL UPDATE ADMISSION EXAM    Name: Sandra Angel  YOB: 1985  Medical Record Number: [id number]    History of Present Illness: Sandar Angel is a 37 year old female who is 39w3d pregnant and being admitted for induction of labor, indication pregnancy-induced hypertension.She was seen in clinic today and had elevated blood pressures.  She has pre-e with both of her pregnancies.  She denies h/s, visual disturbances or epigastric pain    Estimated Date of Delivery: 2022    EGA 39w3d    OB History    Para Term  AB Living   3 2 2 0 0 2   SAB IAB Ectopic Multiple Live Births   0 0 0 0 2      # Outcome Date GA Lbr Alhaji/2nd Weight Sex Delivery Anes PTL Lv   3 Current            2 Term 01/15/21 39w2d 02:38 / 00:11 3.26 kg (7 lb 3 oz) F Vag-Spont EPI N MAU      Name: MELLY ANGEL-SANDRA      Apgar1: 8  Apgar5: 9   1 Term 19 39w3d 05:21 / 00:13 3.572 kg (7 lb 14 oz) F Vag-Spont Local N MAU      Name: MELLY ANGEL-SANDRA      Apgar1: 7  Apgar5: 9        Lab Results   Component Value Date    AS Negative 01/15/2021    HGB 14.1 2021       Prenatal Complications:   1) Advanced maternal age (37)  2) Hx of postpartum SPEC x 2  3) Gestational htn  4) Low platelets on admit (113 & 129)    Exam:      There were no vitals taken for this visit.    Fetal heart Rate Category 1 on admit  Contractions occ,, mild    HEENT grossly normal  Neck: no lymphadenopathy or thryoidomegaly  ABD gravid, non-tender  EXT:  Trace edema, moves freely  Vaginal examTrace 1/thick/high in clinic today  Membranes: intact    Assessment: induction of labor, indication pregnancy-induced hypertension    Plan: Admit - see IP orders  BSUS- vertex  cervical ripening with misoprostol  HEELP labs ordered  Pain medication when requested  Theraputic sleep  MD consultant on call Dr Hoang aware of admit and available prn  Anticipate     Prenatal record reviewed.    Marika Brennan  EFRAIN      11/1/2022   10:24 AM

## 2022-11-01 NOTE — PLAN OF CARE
Problem: Plan of Care - These are the overarching goals to be used throughout the patient stay.    Goal: Plan of Care Review  Description: The Plan of Care Review/Shift note should be completed every shift.  The Outcome Evaluation is a brief statement about your assessment that the patient is improving, declining, or no change.  This information will be displayed automatically on your shift note.  11/1/2022 1459 by Mary Solorzano, RN  Outcome: Progressing  Flowsheets (Taken 11/1/2022 1459)  Plan of Care Reviewed With:   patient   spouse  11/1/2022 1225 by Mary Solorzano, RN  Flowsheets (Taken 11/1/2022 1225)  Plan of Care Reviewed With: patient  Overall Patient Progress: improving  Discussed plan of care with patient. Her main concern is being able to get an epidural when her contractions are stronger. We reviewed her platelets with her, 113, and let her know that we would have lab redraw before epidural placement. She is acknowledges that this result was be within parameters specified by anesthesia in order for this to happen. We will be here to support her, and her  has also been at her bedside. We will continue to monitor.

## 2022-11-02 PROCEDURE — 722N000001 HC LABOR CARE VAGINAL DELIVERY SINGLE

## 2022-11-02 PROCEDURE — 250N000011 HC RX IP 250 OP 636: Performed by: ANESTHESIOLOGY

## 2022-11-02 PROCEDURE — 250N000013 HC RX MED GY IP 250 OP 250 PS 637: Performed by: ADVANCED PRACTICE MIDWIFE

## 2022-11-02 PROCEDURE — 10907ZC DRAINAGE OF AMNIOTIC FLUID, THERAPEUTIC FROM PRODUCTS OF CONCEPTION, VIA NATURAL OR ARTIFICIAL OPENING: ICD-10-PCS | Performed by: ADVANCED PRACTICE MIDWIFE

## 2022-11-02 PROCEDURE — 3E033VJ INTRODUCTION OF OTHER HORMONE INTO PERIPHERAL VEIN, PERCUTANEOUS APPROACH: ICD-10-PCS | Performed by: ADVANCED PRACTICE MIDWIFE

## 2022-11-02 PROCEDURE — 120N000001 HC R&B MED SURG/OB

## 2022-11-02 PROCEDURE — 250N000011 HC RX IP 250 OP 636: Performed by: ADVANCED PRACTICE MIDWIFE

## 2022-11-02 PROCEDURE — 10H07YZ INSERTION OF OTHER DEVICE INTO PRODUCTS OF CONCEPTION, VIA NATURAL OR ARTIFICIAL OPENING: ICD-10-PCS | Performed by: ADVANCED PRACTICE MIDWIFE

## 2022-11-02 PROCEDURE — C9803 HOPD COVID-19 SPEC COLLECT: HCPCS

## 2022-11-02 RX ORDER — OXYCODONE HYDROCHLORIDE 5 MG/1
5 TABLET ORAL EVERY 4 HOURS PRN
Status: DISCONTINUED | OUTPATIENT
Start: 2022-11-02 | End: 2022-11-03 | Stop reason: HOSPADM

## 2022-11-02 RX ORDER — NALOXONE HYDROCHLORIDE 0.4 MG/ML
0.2 INJECTION, SOLUTION INTRAMUSCULAR; INTRAVENOUS; SUBCUTANEOUS
Status: DISCONTINUED | OUTPATIENT
Start: 2022-11-02 | End: 2022-11-03 | Stop reason: HOSPADM

## 2022-11-02 RX ORDER — DOCUSATE SODIUM 100 MG/1
100 CAPSULE, LIQUID FILLED ORAL DAILY
Status: DISCONTINUED | OUTPATIENT
Start: 2022-11-02 | End: 2022-11-03 | Stop reason: HOSPADM

## 2022-11-02 RX ORDER — CARBOPROST TROMETHAMINE 250 UG/ML
250 INJECTION, SOLUTION INTRAMUSCULAR
Status: DISCONTINUED | OUTPATIENT
Start: 2022-11-02 | End: 2022-11-03 | Stop reason: HOSPADM

## 2022-11-02 RX ORDER — MISOPROSTOL 200 UG/1
400 TABLET ORAL
Status: DISCONTINUED | OUTPATIENT
Start: 2022-11-02 | End: 2022-11-03 | Stop reason: HOSPADM

## 2022-11-02 RX ORDER — OXYTOCIN/0.9 % SODIUM CHLORIDE 30/500 ML
340 PLASTIC BAG, INJECTION (ML) INTRAVENOUS CONTINUOUS PRN
Status: DISCONTINUED | OUTPATIENT
Start: 2022-11-02 | End: 2022-11-03 | Stop reason: HOSPADM

## 2022-11-02 RX ORDER — ENOXAPARIN SODIUM 100 MG/ML
40 INJECTION SUBCUTANEOUS EVERY 24 HOURS
Status: DISCONTINUED | OUTPATIENT
Start: 2022-11-02 | End: 2022-11-03 | Stop reason: HOSPADM

## 2022-11-02 RX ORDER — NALOXONE HYDROCHLORIDE 0.4 MG/ML
0.4 INJECTION, SOLUTION INTRAMUSCULAR; INTRAVENOUS; SUBCUTANEOUS
Status: DISCONTINUED | OUTPATIENT
Start: 2022-11-02 | End: 2022-11-03 | Stop reason: HOSPADM

## 2022-11-02 RX ORDER — HYDROCORTISONE 25 MG/G
CREAM TOPICAL 3 TIMES DAILY PRN
Status: DISCONTINUED | OUTPATIENT
Start: 2022-11-02 | End: 2022-11-03 | Stop reason: HOSPADM

## 2022-11-02 RX ORDER — METHYLERGONOVINE MALEATE 0.2 MG/ML
200 INJECTION INTRAVENOUS
Status: DISCONTINUED | OUTPATIENT
Start: 2022-11-02 | End: 2022-11-03 | Stop reason: HOSPADM

## 2022-11-02 RX ORDER — BISACODYL 10 MG
10 SUPPOSITORY, RECTAL RECTAL DAILY PRN
Status: DISCONTINUED | OUTPATIENT
Start: 2022-11-02 | End: 2022-11-03 | Stop reason: HOSPADM

## 2022-11-02 RX ORDER — OXYTOCIN 10 [USP'U]/ML
10 INJECTION, SOLUTION INTRAMUSCULAR; INTRAVENOUS
Status: DISCONTINUED | OUTPATIENT
Start: 2022-11-02 | End: 2022-11-03 | Stop reason: HOSPADM

## 2022-11-02 RX ORDER — MODIFIED LANOLIN
OINTMENT (GRAM) TOPICAL
Status: DISCONTINUED | OUTPATIENT
Start: 2022-11-02 | End: 2022-11-03 | Stop reason: HOSPADM

## 2022-11-02 RX ORDER — MISOPROSTOL 200 UG/1
800 TABLET ORAL
Status: DISCONTINUED | OUTPATIENT
Start: 2022-11-02 | End: 2022-11-03 | Stop reason: HOSPADM

## 2022-11-02 RX ORDER — ACETAMINOPHEN 325 MG/1
650 TABLET ORAL EVERY 4 HOURS PRN
Status: DISCONTINUED | OUTPATIENT
Start: 2022-11-02 | End: 2022-11-03 | Stop reason: HOSPADM

## 2022-11-02 RX ORDER — IBUPROFEN 800 MG/1
800 TABLET, FILM COATED ORAL EVERY 6 HOURS PRN
Status: DISCONTINUED | OUTPATIENT
Start: 2022-11-02 | End: 2022-11-03 | Stop reason: HOSPADM

## 2022-11-02 RX ADMIN — BUPIVACAINE HYDROCHLORIDE 8 ML: 2.5 INJECTION, SOLUTION EPIDURAL; INFILTRATION; INTRACAUDAL at 05:51

## 2022-11-02 RX ADMIN — ACETAMINOPHEN 650 MG: 325 TABLET, FILM COATED ORAL at 12:43

## 2022-11-02 RX ADMIN — KETOROLAC TROMETHAMINE 30 MG: 30 INJECTION, SOLUTION INTRAMUSCULAR; INTRAVENOUS at 06:29

## 2022-11-02 RX ADMIN — DOCUSATE SODIUM 100 MG: 100 CAPSULE, LIQUID FILLED ORAL at 12:43

## 2022-11-02 RX ADMIN — IBUPROFEN 800 MG: 800 TABLET ORAL at 18:51

## 2022-11-02 ASSESSMENT — ACTIVITIES OF DAILY LIVING (ADL)
ADLS_ACUITY_SCORE: 18
ADLS_ACUITY_SCORE: 22
ADLS_ACUITY_SCORE: 22
ADLS_ACUITY_SCORE: 18
ADLS_ACUITY_SCORE: 22
ADLS_ACUITY_SCORE: 18

## 2022-11-02 NOTE — ANESTHESIA PREPROCEDURE EVALUATION
Anesthesia Pre-Procedure Evaluation    Patient: Vadim Atkinson   MRN: [id number] : 1985        Procedure : * No procedures listed *          Past Medical History:   Diagnosis Date     Postpartum hypertension 2019     Pre-eclampsia in postpartum period 2019      No past surgical history on file.   Allergies   Allergen Reactions     Sulfa (Sulfonamide Antibiotics) [Sulfa Drugs] Swelling     Throat swelling      Social History     Tobacco Use     Smoking status: Never     Smokeless tobacco: Never   Substance Use Topics     Alcohol use: Not Currently      Wt Readings from Last 1 Encounters:   22 91.2 kg (201 lb)        Anesthesia Evaluation            ROS/MED HX  ENT/Pulmonary:  - neg pulmonary ROS     Neurologic:  - neg neurologic ROS     Cardiovascular:  - neg cardiovascular ROS   (+) hypertension-----    METS/Exercise Tolerance:     Hematologic:  - neg hematologic  ROS     Musculoskeletal:  - neg musculoskeletal ROS     GI/Hepatic:  - neg GI/hepatic ROS     Renal/Genitourinary:  - neg Renal ROS     Endo:  - neg endo ROS     Psychiatric/Substance Use:  - neg psychiatric ROS     Infectious Disease:  - neg infectious disease ROS     Malignancy:  - neg malignancy ROS     Other:  - neg other ROS             OUTSIDE LABS:  CBC:   Lab Results   Component Value Date    WBC 7.6 2022    WBC 9.9 2021    HGB 12.0 2022    HGB 14.1 2021    HCT 35.4 2022    HCT 39.3 2021     (L) 2022     (L) 2022     BMP:   Lab Results   Component Value Date     2021     2020    POTASSIUM 3.4 (L) 2021    POTASSIUM 4.0 2020    CHLORIDE 103 2021    CHLORIDE 102 2020    CO2 22 2021    CO2 21 (L) 2020    BUN 16 2021    BUN 15 2020    CR 0.66 2022    CR 0.80 2021    GLC 94 2021    GLC 98 2020     COAGS:   Lab Results   Component Value Date    PTT 29 01/15/2021    INR 1.00  01/15/2021     POC: No results found for: BGM, HCG, HCGS  HEPATIC:   Lab Results   Component Value Date    ALBUMIN 3.6 01/21/2021    PROTTOTAL 7.0 01/21/2021    ALT 14 11/01/2022    AST 18 11/01/2022    ALKPHOS 63 01/21/2021    BILITOTAL 0.4 01/21/2021     OTHER:   Lab Results   Component Value Date    SENA 9.1 01/21/2021    MAG 6.4 (HH) 01/21/2021    TSH 1.58 01/28/2020    CRP 1.4 (H) 01/28/2020    SED 61 (H) 01/28/2020       Anesthesia Plan    ASA Status:  2      Anesthesia Type: Epidural.              Consents    Anesthesia Plan(s) and associated risks, benefits, and realistic alternatives discussed. Questions answered and patient/representative(s) expressed understanding.     - Discussed: Risks, Benefits and Alternatives for the PROCEDURE were discussed     - Discussed with:  Patient         Postoperative Care            Comments:                Arnoldo Juarez MD

## 2022-11-02 NOTE — ANESTHESIA PROCEDURE NOTES
"Epidural catheter Procedure Note    Pre-Procedure   Staff -        Anesthesiologist:  Arnoldo Juarez MD       Performed By: anesthesiologist       Location: OB       Procedure Start/Stop Times: 11/1/2022 9:35 PM and 11/1/2022 9:55 PM       Pre-Anesthestic Checklist: patient identified, IV checked, risks and benefits discussed, informed consent, monitors and equipment checked, pre-op evaluation, at physician/surgeon's request and post-op pain management  Timeout:       Correct Patient: Yes        Correct Procedure: Yes        Correct Site: Yes        Correct Position: Yes   Procedure Documentation  Procedure: epidural catheter       Patient Position: sitting       Patient Prep/Sterile Barriers: sterile gloves, mask, patient draped       Skin prep: Chloraprep       Local skin infiltrated with mL of 2% lidocaine.        Insertion Site: T7-8, L3-4. (midline approach).       Technique: LORT saline        Needle Type: Touhy needle       Needle Gauge: 18.        Catheter: 20 G.          Catheter threaded easily.             # of attempts: 1 and  # of redirects:  0    Assessment/Narrative         Paresthesias: Resolved and No.       Test dose of 3 mL lidocaine 1.5% w/ 1:200,000 epinephrine at.         Test dose negative, 3 minutes after injection, for signs of intravascular, subdural, or intrathecal injection.       Insertion/Infusion Method: LORT saline       Aspiration negative for Heme or CSF via Epidural Catheter.    Medication(s) Administered   0.25% Bupivacaine PF (Epidural) - EPIDURAL   8 mL - 11/1/2022 9:50:00 PM  Medication Administration Time: 11/1/2022 9:35 PM      FOR [facility] ([facility]) ONLY:   Pain Team Contact information: please page the Pain Team Via [employer]. Search \"Pain\". During daytime hours, please page the attending first. At night please page the resident first.    "

## 2022-11-02 NOTE — ANESTHESIA POSTPROCEDURE EVALUATION
Patient: Vadim Atkinson    Procedure: * No procedures listed *       Anesthesia Type:  No value filed.    Note:     Postop Pain Control: Uneventful            Sign Out: Well controlled pain   PONV: No   Neuro/Psych: Uneventful            Sign Out: Acceptable/Baseline neuro status   Airway/Respiratory: Uneventful            Sign Out: Acceptable/Baseline resp. status   CV/Hemodynamics: Uneventful            Sign Out: Acceptable CV status; No obvious hypovolemia; No obvious fluid overload   Other NRE: NONE   DID A NON-ROUTINE EVENT OCCUR? No           Last vitals:  Vitals:    11/02/22 0700 11/02/22 0715 11/02/22 0729   BP: 129/60 137/60 127/58   Pulse:      Resp:      Temp:      SpO2:          Electronically Signed By: Bony Aguirre MD  November 2, 2022  7:55 AM

## 2022-11-02 NOTE — PLAN OF CARE
Problem: Plan of Care - These are the overarching goals to be used throughout the patient stay.    Goal: Plan of Care Review  Description: The Plan of Care Review/Shift note should be completed every shift.  The Outcome Evaluation is a brief statement about your assessment that the patient is improving, declining, or no change.  This information will be displayed automatically on your shift note.  Outcome: Progressing  Flowsheets (Taken 11/2/2022 1803)  Outcome Evaluation: Patient resting in bed, tylenol given for pain. Ambulating to bathroom and voiding. Reports small amount of bleeding, denies passing clots. VSS

## 2022-11-02 NOTE — PROVIDER NOTIFICATION
Pt feeling more uncomfortable and considering epidural soon. Next dose of cytotec held at this time. SVE 3/60/-2, grossman 7    H EFRAIN Nicolas notified of above. Provider to put in order for pitocin augmentation as needed if patient's contractions space out. Pt able to get epidural when requested, STAT platelet level to be drawn upon pt asking for epidural.

## 2022-11-02 NOTE — PROGRESS NOTES
Comfortable s/p epid. FOB is at bedside and supportive Cat 1 tracing.. Difficulty tracing ctx despite interventions. Unable to safely increase pitocin. Currently at 8mu/min  Cervix remained unchanged at 3/70/-2 since 2025.  Discussed AROM and IUPC placement. Pt consented. AROM for clear fluid.  IUPC placed

## 2022-11-02 NOTE — PROVIDER NOTIFICATION
Marika Nicolas, EFRAIN updated on patient status, SVE. Aware of inability/trouble to monitor contractions despite frequent repositioning of patient and toco. Plan for CNM to come to bedside to rupture patient and place IUPC.

## 2022-11-02 NOTE — PROVIDER NOTIFICATION
Marika Nicolas CNM requested SVE after pt got epidural, pt unchanged 3/60/-2. Currently comfortable with labor epidural, cat l, stephanie every 4-5 min per toco. Plan to start pitocin and recheck cervix in 2 hours. Will update provider at that time. Verbal order to place ray catheter as well.

## 2022-11-02 NOTE — L&D DELIVERY NOTE
Vaginal Delivery Note    Name: Vadim Atkinson  : 1985  MRN: [id number]    PRE DELIVERY DIAGNOSIS   37 year old  3 Para 2 at 39w4d      1) Advanced maternal age (37)  2) Hx of postpartum SPEC x 2  3) Gestational htn  4) Low platelets on admit (113 & 129)    POST DELIVERY DIAGNOSIS  1) 37 year old  @ 39w4d  2) Delivery of a viable infant weighing   via     YOB: 2020     Birth Time: 6:11 AM       Augmentation Yes              Indication:   Induction Yes                      Indication: gHTN    Monitors: External and IUPC     GBS: Negative    ROM: AROM  Fluid Type: Clear    Labor Analgesia/Anesthesia:Epidural    Cord pH obtained: No  Placenta Date/Time: 2022  6:14 AM   Placenta submitted to Pathology: No    Description of procedure:   37 year old  with PNC w/ MWC and pregnancy complicated by See HPI presented to L&D with plan for induction.  Her hospital course was uncomplicated.  Patient progressed to complete with artificial rupture of membranes and pitocin   Shoulder Dystocia No  Operative Vaginal Delivery No  Infant spontaneously delivered over an intact perineum.   Infant delivered in ORI position.  Nuchal cord No   Placenta spontaneously delivered: 2022  6:14 AM  grossly intact with 3 vessel cord.  Infant:  Live, vigorous infant  was handed to mom.    Delivery was complicated by nothing Interventions included fundal massage and pitocin.    Delivery QBL (mL): 50    Mother and Infant stable.    Dr. Hoang aware of patient status and remains available for consultation and collaboration as needed.    Marika Nicolas CNM    2022 6:23 AM

## 2022-11-02 NOTE — PLAN OF CARE
Problem: Postpartum (Vaginal Delivery)  Goal: Optimal Pain Control and Function  Outcome: Progressing     Problem: Postpartum (Vaginal Delivery)  Goal: Successful Maternal Role Transition  Outcome: Progressing

## 2022-11-02 NOTE — PROVIDER NOTIFICATION
Patient with recurrent variables despite 250ml bolus and frequent position changes post AROM. CNM at bedside to assess patient and review strip. Per CNM- okay to continue increasing pitocin and keep patient in same position at this time.

## 2022-11-03 VITALS
OXYGEN SATURATION: 98 % | HEART RATE: 72 BPM | WEIGHT: 201 LBS | TEMPERATURE: 97.9 F | HEIGHT: 68 IN | RESPIRATION RATE: 19 BRPM | BODY MASS INDEX: 30.46 KG/M2 | DIASTOLIC BLOOD PRESSURE: 80 MMHG | SYSTOLIC BLOOD PRESSURE: 125 MMHG

## 2022-11-03 LAB — PLATELET # BLD AUTO: 127 10E3/UL (ref 150–450)

## 2022-11-03 PROCEDURE — 250N000013 HC RX MED GY IP 250 OP 250 PS 637: Performed by: ADVANCED PRACTICE MIDWIFE

## 2022-11-03 PROCEDURE — 85049 AUTOMATED PLATELET COUNT: CPT | Performed by: ADVANCED PRACTICE MIDWIFE

## 2022-11-03 PROCEDURE — 36415 COLL VENOUS BLD VENIPUNCTURE: CPT | Performed by: ADVANCED PRACTICE MIDWIFE

## 2022-11-03 RX ORDER — ACETAMINOPHEN 325 MG/1
650 TABLET ORAL EVERY 4 HOURS PRN
COMMUNITY
Start: 2022-11-03

## 2022-11-03 RX ORDER — IBUPROFEN 800 MG/1
800 TABLET, FILM COATED ORAL EVERY 8 HOURS PRN
COMMUNITY
Start: 2022-11-03

## 2022-11-03 RX ORDER — DOCUSATE SODIUM 100 MG/1
100 CAPSULE, LIQUID FILLED ORAL DAILY
COMMUNITY
Start: 2022-11-04

## 2022-11-03 RX ADMIN — DOCUSATE SODIUM 100 MG: 100 CAPSULE, LIQUID FILLED ORAL at 08:39

## 2022-11-03 RX ADMIN — IBUPROFEN 800 MG: 800 TABLET ORAL at 05:58

## 2022-11-03 ASSESSMENT — ACTIVITIES OF DAILY LIVING (ADL)
ADLS_ACUITY_SCORE: 18

## 2022-11-03 NOTE — PLAN OF CARE
Problem: Plan of Care - These are the overarching goals to be used throughout the patient stay.    Goal: Plan of Care Review  Description: The Plan of Care Review/Shift note should be completed every shift.  The Outcome Evaluation is a brief statement about your assessment that the patient is improving, declining, or no change.  This information will be displayed automatically on your shift note.  Outcome: Met  Flowsheets (Taken 11/3/2022 4120)  Outcome Evaluation: Patient discharged home. Instructions and summary rpovided. Patient aware of follow up appointments.  Plan of Care Reviewed With: patient

## 2022-11-03 NOTE — DISCHARGE SUMMARY
OB Discharge Summary      Date:  11/3/2022    Name:  Vadim Atkinson  :  1985  MRN:  [id number]      Admission Date:  2022  Delivery Date: 2022   Gestational Age at Delivery:  39w4d  Discharge Date:  11/3/2022    Principal Diagnosis:    Patient Active Problem List   Diagnosis     Acne     Pregnant     Preeclampsia in postpartum period     Hypertension     Encounter for induction of labor         Conditions complicating Pregnancy:  1) Advanced maternal age (37)  2) Hx of postpartum SPEC x 2  3) Gestational htn  4) Low platelets on admit (113 & 129)    Procedure(s) Performed: ripening, pitocin, AROM, epidural    Indication for :  none  Indication for Induction:  gHTN     Condition at Discharge:  Stable  Discharge Medications:      Review of your medicines      START taking      Dose / Directions   acetaminophen 325 MG tablet  Commonly known as: TYLENOL  Used for:  (normal spontaneous vaginal delivery)      Dose: 650 mg  Take 2 tablets (650 mg) by mouth every 4 hours as needed for mild pain or fever (greater than or equal to 38  C /100.4  F (oral) or 38.5  C/ 101.4  F (core).)  Refills: 0     docusate sodium 100 MG capsule  Commonly known as: COLACE  Used for:  (normal spontaneous vaginal delivery)      Dose: 100 mg  Start taking on: 2022  Take 1 capsule (100 mg) by mouth daily  Refills: 0     ibuprofen 800 MG tablet  Commonly known as: ADVIL/MOTRIN  Used for:  (normal spontaneous vaginal delivery)      Dose: 800 mg  Take 1 tablet (800 mg) by mouth every 8 hours as needed for other (cramping)  Refills: 0        CONTINUE these medicines which have NOT CHANGED      Dose / Directions   aspirin 81 MG EC tablet  Commonly known as: ASA      Dose: 1 tablet  [ASPIRIN 81 MG EC TABLET] Take 1 tablet by mouth at bedtime.  Refills: 0     FISH OIL CONCENTRATE PO      Dose: 1 capsule  [DOCOSAHEXAENOIC ACID/EPA (FISH OIL ORAL)] Take 1 capsule by mouth 3 (three) times a  day.  Refills: 0        STOP taking    magnesium citrate solution (NOT currently available)        MEDICATION CANNOT BE REORDERED - PLEASE MANUALLY REORDER AND DISCONTINUE THE OLD ORDER              Where to get your medicines      Some of these will need a paper prescription and others can be bought over the counter. Ask your nurse if you have questions.    You don't need a prescription for these medications    acetaminophen 325 MG tablet    docusate sodium 100 MG capsule    ibuprofen 800 MG tablet          Discharge Plan:    Follow up with /CNM: In 2 weeks with MD for BP check, and 6 weeks for routine PP visit   Patient Instructions:      Physical activity: as tolerated    Diet:  As tolerated    Medication: see above      Reviewed hx of PP preeclampsia, she is aware of s/sxs, has BP cuff at home and will come to clinic in 2 weeks or sooner if needed.        Physician/CNM: NAFISA Quintanilla CNM, Dr. remains available for consultation and collaboration as needed.    Name:  Vadim Atkinson  :  1985  MRN:  [id number]

## 2022-11-03 NOTE — DISCHARGE INSTRUCTIONS

## 2022-11-03 NOTE — PROGRESS NOTES
"Vaginal Delivery Postpartum Day 1    Patient Name:  Vadim Atkinson  :      1985  MRN:      [id number]      Assessment:  Normal postpartum course.    Plan:  Continue current care.      Subjective:  The patient feels well:  Voiding without difficulty, lochia normal, tolerating normal diet, ambulating without difficulty and passing flatus. Voiding independently without complication. Pain is well controlled with current medications.  The patient has no emotional concerns.  The baby is well and being fed by breast.      YOB: 2020   Birth Time: 6:11 AM     Prenatal Complications include:   1) Advanced maternal age (37)  2) Hx of postpartum SPEC x 2  3) Gestational htn  4) Low platelets on admit (113 & 129)    Objective:  /80 (BP Location: Left arm)   Pulse 72   Temp 97.9  F (36.6  C) (Oral)   Resp 19   Ht 1.727 m (5' 8\")   Wt 91.2 kg (201 lb)   SpO2 98%   Breastfeeding Unknown   BMI 30.56 kg/m    Patient Vitals for the past 24 hrs:   BP Temp Temp src Pulse Resp SpO2   22 0835 125/80 97.9  F (36.6  C) Oral 72 19 98 %   22 2300 109/72 97.9  F (36.6  C) -- 82 16 98 %   22 1659 129/72 97.9  F (36.6  C) Oral 68 16 98 %   22 1241 122/77 98.5  F (36.9  C) Oral 62 17 --       Exam: Patient A&O x 3. No acute distress, breathing unlabored.The amount and color of the lochia is appropriate for the duration of recovery. Uterine fundus is firm at U-1. Perineum: no significant edema      Lab Results   Component Value Date    AS Negative 2022    HGB 12.0 2022       Immunization History   Administered Date(s) Administered     COVID-19,PF,Moderna 2021, 2021, 2021     Flu, Unspecified 2020     Tdap (Adacel,Boostrix) 2019       Provider:  NAFISA Quintanilla CNM    Date:  11/3/2022  Time:  12:19 PM      "

## 2022-11-04 ENCOUNTER — TELEPHONE (OUTPATIENT)
Dept: OBGYN | Facility: CLINIC | Age: 37
End: 2022-11-04

## 2023-09-14 ENCOUNTER — TRANSFERRED RECORDS (OUTPATIENT)
Dept: HEALTH INFORMATION MANAGEMENT | Facility: CLINIC | Age: 38
End: 2023-09-14
Payer: COMMERCIAL

## 2023-10-08 ENCOUNTER — HEALTH MAINTENANCE LETTER (OUTPATIENT)
Age: 38
End: 2023-10-08

## 2023-11-17 ENCOUNTER — TRANSFERRED RECORDS (OUTPATIENT)
Dept: HEALTH INFORMATION MANAGEMENT | Facility: CLINIC | Age: 38
End: 2023-11-17
Payer: COMMERCIAL

## 2024-01-16 ENCOUNTER — TRANSFERRED RECORDS (OUTPATIENT)
Dept: HEALTH INFORMATION MANAGEMENT | Facility: CLINIC | Age: 39
End: 2024-01-16
Payer: COMMERCIAL

## 2024-02-16 ENCOUNTER — TRANSFERRED RECORDS (OUTPATIENT)
Dept: HEALTH INFORMATION MANAGEMENT | Facility: CLINIC | Age: 39
End: 2024-02-16

## 2024-02-16 ENCOUNTER — LAB REQUISITION (OUTPATIENT)
Dept: LAB | Facility: CLINIC | Age: 39
End: 2024-02-16
Payer: COMMERCIAL

## 2024-02-16 DIAGNOSIS — M25.561 PAIN IN RIGHT KNEE: ICD-10-CM

## 2024-02-16 LAB
APPEARANCE FLD: ABNORMAL
CELL COUNT BODY FLUID SOURCE: ABNORMAL
COLOR FLD: ABNORMAL
CRYSTALS SNV MICRO: NORMAL
LYMPHOCYTES NFR FLD MANUAL: 6 %
MONOS+MACROS NFR FLD MANUAL: 92 %
NEUTS BAND NFR FLD MANUAL: 2 %
WBC # FLD AUTO: 430 /UL

## 2024-02-16 PROCEDURE — 89060 EXAM SYNOVIAL FLUID CRYSTALS: CPT | Mod: ORL | Performed by: PHYSICAL MEDICINE & REHABILITATION

## 2024-02-16 PROCEDURE — 89051 BODY FLUID CELL COUNT: CPT | Mod: ORL | Performed by: PHYSICAL MEDICINE & REHABILITATION

## 2024-07-01 ENCOUNTER — TRANSFERRED RECORDS (OUTPATIENT)
Dept: HEALTH INFORMATION MANAGEMENT | Facility: CLINIC | Age: 39
End: 2024-07-01
Payer: COMMERCIAL

## 2024-07-19 ENCOUNTER — TRANSFERRED RECORDS (OUTPATIENT)
Dept: HEALTH INFORMATION MANAGEMENT | Facility: CLINIC | Age: 39
End: 2024-07-19
Payer: COMMERCIAL

## 2024-07-23 ENCOUNTER — TRANSFERRED RECORDS (OUTPATIENT)
Dept: HEALTH INFORMATION MANAGEMENT | Facility: CLINIC | Age: 39
End: 2024-07-23

## 2024-07-23 ENCOUNTER — LAB REQUISITION (OUTPATIENT)
Dept: LAB | Facility: CLINIC | Age: 39
End: 2024-07-23
Payer: COMMERCIAL

## 2024-07-23 DIAGNOSIS — M25.569 PAIN IN UNSPECIFIED KNEE: ICD-10-CM

## 2024-07-23 LAB
ANION GAP SERPL CALCULATED.3IONS-SCNC: 10 MMOL/L (ref 7–15)
B BURGDOR IGG+IGM SER QL: 0.09
BASOPHILS # BLD AUTO: 0 10E3/UL (ref 0–0.2)
BASOPHILS NFR BLD AUTO: 0 %
BUN SERPL-MCNC: 13.4 MG/DL (ref 6–20)
CALCIUM SERPL-MCNC: 9.5 MG/DL (ref 8.8–10.4)
CHLORIDE SERPL-SCNC: 103 MMOL/L (ref 98–107)
CREAT SERPL-MCNC: 0.94 MG/DL (ref 0.51–0.95)
CRP SERPL-MCNC: <3 MG/L
EGFRCR SERPLBLD CKD-EPI 2021: 80 ML/MIN/1.73M2
EOSINOPHIL # BLD AUTO: 0.1 10E3/UL (ref 0–0.7)
EOSINOPHIL NFR BLD AUTO: 1 %
ERYTHROCYTE [SEDIMENTATION RATE] IN BLOOD BY WESTERGREN METHOD: 10 MM/HR (ref 0–20)
GLUCOSE SERPL-MCNC: 101 MG/DL (ref 70–99)
HCO3 SERPL-SCNC: 28 MMOL/L (ref 22–29)
IMM GRANULOCYTES # BLD: 0 10E3/UL
IMM GRANULOCYTES NFR BLD: 0 %
LYMPHOCYTES # BLD AUTO: 1.3 10E3/UL (ref 0.8–5.3)
LYMPHOCYTES NFR BLD AUTO: 28 %
MONOCYTES # BLD AUTO: 0.5 10E3/UL (ref 0–1.3)
MONOCYTES NFR BLD AUTO: 11 %
NEUTROPHILS # BLD AUTO: 2.7 10E3/UL (ref 1.6–8.3)
NEUTROPHILS NFR BLD AUTO: 59 %
NRBC # BLD AUTO: 0 10E3/UL
NRBC BLD AUTO-RTO: 0 /100
POTASSIUM SERPL-SCNC: 3.9 MMOL/L (ref 3.4–5.3)
RHEUMATOID FACT SERPL-ACNC: <10 IU/ML
SODIUM SERPL-SCNC: 141 MMOL/L (ref 135–145)
T3 SERPL-MCNC: 71 NG/DL (ref 85–202)
T4 FREE SERPL-MCNC: 1.24 NG/DL (ref 0.9–1.7)
T4 SERPL-MCNC: 6.9 UG/DL (ref 4.5–11.7)
TSH SERPL DL<=0.005 MIU/L-ACNC: 1.19 UIU/ML (ref 0.3–4.2)
URATE SERPL-MCNC: 4.1 MG/DL (ref 2.4–5.7)
WBC # BLD AUTO: 4.6 10E3/UL (ref 4–11)

## 2024-07-23 PROCEDURE — 85652 RBC SED RATE AUTOMATED: CPT | Mod: ORL | Performed by: ORTHOPAEDIC SURGERY

## 2024-07-23 PROCEDURE — 84443 ASSAY THYROID STIM HORMONE: CPT | Mod: ORL | Performed by: ORTHOPAEDIC SURGERY

## 2024-07-23 PROCEDURE — 86038 ANTINUCLEAR ANTIBODIES: CPT | Mod: ORL | Performed by: ORTHOPAEDIC SURGERY

## 2024-07-23 PROCEDURE — 86618 LYME DISEASE ANTIBODY: CPT | Mod: ORL | Performed by: ORTHOPAEDIC SURGERY

## 2024-07-23 PROCEDURE — 86200 CCP ANTIBODY: CPT | Mod: ORL | Performed by: ORTHOPAEDIC SURGERY

## 2024-07-23 PROCEDURE — 85048 AUTOMATED LEUKOCYTE COUNT: CPT | Mod: ORL | Performed by: ORTHOPAEDIC SURGERY

## 2024-07-23 PROCEDURE — 84480 ASSAY TRIIODOTHYRONINE (T3): CPT | Mod: ORL | Performed by: ORTHOPAEDIC SURGERY

## 2024-07-23 PROCEDURE — 84550 ASSAY OF BLOOD/URIC ACID: CPT | Mod: ORL | Performed by: ORTHOPAEDIC SURGERY

## 2024-07-23 PROCEDURE — 36415 COLL VENOUS BLD VENIPUNCTURE: CPT | Mod: ORL | Performed by: ORTHOPAEDIC SURGERY

## 2024-07-23 PROCEDURE — 84439 ASSAY OF FREE THYROXINE: CPT | Mod: ORL | Performed by: ORTHOPAEDIC SURGERY

## 2024-07-23 PROCEDURE — 80048 BASIC METABOLIC PNL TOTAL CA: CPT | Mod: ORL | Performed by: ORTHOPAEDIC SURGERY

## 2024-07-23 PROCEDURE — 86431 RHEUMATOID FACTOR QUANT: CPT | Mod: ORL | Performed by: ORTHOPAEDIC SURGERY

## 2024-07-23 PROCEDURE — 84436 ASSAY OF TOTAL THYROXINE: CPT | Mod: ORL | Performed by: ORTHOPAEDIC SURGERY

## 2024-07-23 PROCEDURE — 86140 C-REACTIVE PROTEIN: CPT | Mod: ORL | Performed by: ORTHOPAEDIC SURGERY

## 2024-07-24 LAB
ANA SER QL IF: NEGATIVE
CCP AB SER IA-ACNC: 1.1 U/ML

## 2024-10-07 ENCOUNTER — TRANSFERRED RECORDS (OUTPATIENT)
Dept: HEALTH INFORMATION MANAGEMENT | Facility: CLINIC | Age: 39
End: 2024-10-07
Payer: COMMERCIAL

## 2024-11-20 ENCOUNTER — TRANSFERRED RECORDS (OUTPATIENT)
Dept: HEALTH INFORMATION MANAGEMENT | Facility: CLINIC | Age: 39
End: 2024-11-20
Payer: COMMERCIAL

## 2024-11-25 ENCOUNTER — TRANSFERRED RECORDS (OUTPATIENT)
Dept: HEALTH INFORMATION MANAGEMENT | Facility: CLINIC | Age: 39
End: 2024-11-25
Payer: COMMERCIAL

## 2024-12-01 ENCOUNTER — HEALTH MAINTENANCE LETTER (OUTPATIENT)
Age: 39
End: 2024-12-01

## 2024-12-17 ENCOUNTER — TRANSFERRED RECORDS (OUTPATIENT)
Dept: HEALTH INFORMATION MANAGEMENT | Facility: CLINIC | Age: 39
End: 2024-12-17
Payer: COMMERCIAL

## 2025-01-22 ENCOUNTER — TRANSCRIBE ORDERS (OUTPATIENT)
Dept: OTHER | Age: 40
End: 2025-01-22

## 2025-01-22 DIAGNOSIS — R53.81 MALAISE: ICD-10-CM

## 2025-01-22 DIAGNOSIS — M25.50 ARTHRALGIA, UNSPECIFIED JOINT: Primary | ICD-10-CM

## 2025-01-26 ENCOUNTER — TRANSFERRED RECORDS (OUTPATIENT)
Dept: HEALTH INFORMATION MANAGEMENT | Facility: CLINIC | Age: 40
End: 2025-01-26

## 2025-01-28 ENCOUNTER — TRANSFERRED RECORDS (OUTPATIENT)
Dept: HEALTH INFORMATION MANAGEMENT | Facility: CLINIC | Age: 40
End: 2025-01-28

## 2025-02-11 ENCOUNTER — TRANSFERRED RECORDS (OUTPATIENT)
Dept: HEALTH INFORMATION MANAGEMENT | Facility: CLINIC | Age: 40
End: 2025-02-11

## 2025-03-05 ENCOUNTER — TRANSFERRED RECORDS (OUTPATIENT)
Dept: HEALTH INFORMATION MANAGEMENT | Facility: CLINIC | Age: 40
End: 2025-03-05
Payer: COMMERCIAL

## 2025-03-13 ENCOUNTER — LAB (OUTPATIENT)
Dept: LAB | Facility: CLINIC | Age: 40
End: 2025-03-13
Payer: COMMERCIAL

## 2025-03-13 ENCOUNTER — OFFICE VISIT (OUTPATIENT)
Dept: RHEUMATOLOGY | Facility: CLINIC | Age: 40
End: 2025-03-13
Payer: COMMERCIAL

## 2025-03-13 VITALS
DIASTOLIC BLOOD PRESSURE: 77 MMHG | SYSTOLIC BLOOD PRESSURE: 120 MMHG | OXYGEN SATURATION: 98 % | HEART RATE: 82 BPM | WEIGHT: 157.1 LBS | BODY MASS INDEX: 23.81 KG/M2 | HEIGHT: 68 IN

## 2025-03-13 DIAGNOSIS — Z11.59 NEED FOR HEPATITIS B SCREENING TEST: ICD-10-CM

## 2025-03-13 DIAGNOSIS — M54.50 CHRONIC MIDLINE LOW BACK PAIN WITHOUT SCIATICA: ICD-10-CM

## 2025-03-13 DIAGNOSIS — G89.29 CHRONIC MIDLINE LOW BACK PAIN WITHOUT SCIATICA: ICD-10-CM

## 2025-03-13 DIAGNOSIS — Z11.59 ENCOUNTER FOR HEPATITIS C SCREENING TEST FOR LOW RISK PATIENT: ICD-10-CM

## 2025-03-13 DIAGNOSIS — Z11.1 SCREENING-PULMONARY TB: ICD-10-CM

## 2025-03-13 DIAGNOSIS — J32.8 OTHER CHRONIC SINUSITIS: ICD-10-CM

## 2025-03-13 DIAGNOSIS — M54.2 NECK PAIN: ICD-10-CM

## 2025-03-13 DIAGNOSIS — M54.2 NECK PAIN: Primary | ICD-10-CM

## 2025-03-13 LAB
ALBUMIN UR-MCNC: NEGATIVE MG/DL
ALT SERPL W P-5'-P-CCNC: 26 U/L (ref 0–50)
APPEARANCE UR: CLEAR
BILIRUB UR QL STRIP: NEGATIVE
COLOR UR AUTO: ABNORMAL
CREAT SERPL-MCNC: 0.93 MG/DL (ref 0.51–0.95)
CRP SERPL-MCNC: <3 MG/L
EGFRCR SERPLBLD CKD-EPI 2021: 81 ML/MIN/1.73M2
ERYTHROCYTE [DISTWIDTH] IN BLOOD BY AUTOMATED COUNT: 12 % (ref 10–15)
ERYTHROCYTE [SEDIMENTATION RATE] IN BLOOD BY WESTERGREN METHOD: 11 MM/HR (ref 0–20)
GLUCOSE UR STRIP-MCNC: NEGATIVE MG/DL
HBV CORE AB SERPL QL IA: NONREACTIVE
HBV SURFACE AB SERPL IA-ACNC: <3.5 M[IU]/ML
HBV SURFACE AB SERPL IA-ACNC: NONREACTIVE M[IU]/ML
HBV SURFACE AG SERPL QL IA: NONREACTIVE
HCT VFR BLD AUTO: 39 % (ref 35–47)
HCV AB SERPL QL IA: NONREACTIVE
HGB BLD-MCNC: 13.7 G/DL (ref 11.7–15.7)
HGB UR QL STRIP: NEGATIVE
KETONES UR STRIP-MCNC: NEGATIVE MG/DL
LEUKOCYTE ESTERASE UR QL STRIP: NEGATIVE
MCH RBC QN AUTO: 30.2 PG (ref 26.5–33)
MCHC RBC AUTO-ENTMCNC: 35.1 G/DL (ref 31.5–36.5)
MCV RBC AUTO: 86 FL (ref 78–100)
MUCOUS THREADS #/AREA URNS LPF: PRESENT /LPF
NITRATE UR QL: NEGATIVE
PH UR STRIP: 7 [PH] (ref 5–7)
PLATELET # BLD AUTO: 179 10E3/UL (ref 150–450)
RBC # BLD AUTO: 4.54 10E6/UL (ref 3.8–5.2)
RBC URINE: <1 /HPF
SP GR UR STRIP: 1.01 (ref 1–1.03)
SQUAMOUS EPITHELIAL: <1 /HPF
UROBILINOGEN UR STRIP-MCNC: <2 MG/DL
WBC # BLD AUTO: 5.5 10E3/UL (ref 4–11)
WBC URINE: <1 /HPF

## 2025-03-13 PROCEDURE — 86481 TB AG RESPONSE T-CELL SUSP: CPT

## 2025-03-13 PROCEDURE — 82565 ASSAY OF CREATININE: CPT

## 2025-03-13 PROCEDURE — 83529 ASAY OF INTERLEUKIN-6 (IL-6): CPT

## 2025-03-13 PROCEDURE — 86706 HEP B SURFACE ANTIBODY: CPT

## 2025-03-13 PROCEDURE — 86140 C-REACTIVE PROTEIN: CPT

## 2025-03-13 PROCEDURE — 81001 URINALYSIS AUTO W/SCOPE: CPT

## 2025-03-13 PROCEDURE — 36415 COLL VENOUS BLD VENIPUNCTURE: CPT

## 2025-03-13 PROCEDURE — 81374 HLA I TYPING 1 ANTIGEN LR: CPT

## 2025-03-13 PROCEDURE — 85652 RBC SED RATE AUTOMATED: CPT

## 2025-03-13 PROCEDURE — 84460 ALANINE AMINO (ALT) (SGPT): CPT

## 2025-03-13 PROCEDURE — 86704 HEP B CORE ANTIBODY TOTAL: CPT

## 2025-03-13 PROCEDURE — 86803 HEPATITIS C AB TEST: CPT

## 2025-03-13 PROCEDURE — 83516 IMMUNOASSAY NONANTIBODY: CPT

## 2025-03-13 PROCEDURE — 87340 HEPATITIS B SURFACE AG IA: CPT

## 2025-03-13 PROCEDURE — 85027 COMPLETE CBC AUTOMATED: CPT

## 2025-03-13 PROCEDURE — 83876 ASSAY MYELOPEROXIDASE: CPT

## 2025-03-13 RX ORDER — DEXTROAMPHETAMINE SACCHARATE, AMPHETAMINE ASPARTATE, DEXTROAMPHETAMINE SULFATE, AND AMPHETAMINE SULFATE 2.5; 2.5; 2.5; 2.5 MG/1; MG/1; MG/1; MG/1
10 TABLET ORAL 3 TIMES DAILY
COMMUNITY
Start: 2024-03-11

## 2025-03-13 RX ORDER — LORAZEPAM 0.5 MG/1
0.5 TABLET ORAL PRN
COMMUNITY

## 2025-03-13 NOTE — PROGRESS NOTES
"  This document was created using a software with less than 100% fidelity, at times resulting in unintended, even erroneous syntax and grammar.  The reader is advised to keep this under consideration while reviewing, interpreting this note.      Rheumatology Consult Note      Vadim Atkinson     YOB: 1985 Age: 39 year old    Date of visit: 3/13/25    PCP: Jenny Peoples    Chief Complaint   1.  Neck pain, low back pain, shooting pain in the fingers (9 months)              Assessment and Plan   1.  Patient interviewed and examined, prior rheumatologic workup reviewed.  Her rheumatoid serologies (RF, CCP) are negative, CHARITY negative, sed rate and CRP normal.  The only rheumatologic condition that may explain her spinal pain may be  spondylitis (psoriatic or ankylosing).  The HLA-B27 is positive and more than 90% people with ankylosing spondylitis, however for psoriatic spondylitis there is NO SPECIFIC BLOOD TEST.  In the right clinical setting of inflammatory sounding back pain, if there is a family history of psoriasis (psoriasis can be confused with \"eczema \") 2-3-month trial with a biologic medication like Enbrel or Humira helps clarifies the situation.  Neck and back pain due to spondylitis typically responds nicely to biologic therapies while mechanical causes of back pain do not.We discussed psoriatic arthritis, its prevalence in patients with psoriasis or family history of psoriasis (30%), the different forms of psoriatic arthritis (oligoarthritis= few joints, inflamed polyarthritis= many joints inflamed spondylitis= inflammatory back pain, enthesitis= inflammation of ligaments, tendons).  Printed literature on psoriatic arthritis provided to the patient.  We will screen her for TB, hepatitis B and C in case biologic therapy is needed.    2.  Her other main symptom is chronic sinusitis, nasal crusting and nosebleeds despite multiple courses of antibiotics and sinus surgery.  We will check " "proteinase 3 antibodies and myeloperoxidase antibodies to screen for rheumatologic conditions like granulomatous polyangiitis (GPA) which can affect the sinuses.  Printed literature on GPA provided.    Follow-up 1 month    CC PCP            BEBETO     Destiny is a nice 39-year-old female referred to rheumatology for neck and back pain which started about 9 months ago without any preceding trauma or injury.  There is no past history of spinal symptoms, she is active, was exercising regularly (including bodybuilding), about 9 months ago started noticing nonradiating pain in the neck, interscapular region, lower back and also in the elbows along with \"shooting pains \"in her index fingers.  There is no accompanying swelling or redness of the fingers of toes.  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  Her neck and back are \"super stiff in the morning \".  There is no radiation of the neck or back pain into the upper or lower extremities.  No fever, chills, weight loss.  The neck pain is more prominent up to 7/10, ache waxes and wanes.  Does not wake her up from sleep.  The lower back pain averages about 3/10.  She has taken Tylenol, Advil on a as needed basis.  She has also been seeing ENT for recurrent sinusitis despite multiple antibiotic courses for sinus infections.  She underwent a right maxillary antrostomy with right anterior ethmoidectomy in November 2024.  Continues to have nasal crusting, intermittent nosebleeds and uses sinus rinsing.  She denies any blood in the urine, has intermittent ringing in the right ear but not the left.  She has also noticed intermittent \"phantom smells \"(like burning dust).  She has also noticed marked increase in fatigue, poor sleep quality, does not feel refreshed in the morning and a lot of malaise.  Has some paresthesias (pins-and-needles) in " "the fingers, the fingers \"feel cold \", but no Raynaud's type color changes.    Labs reviewed: Rheumatoid factor negative, CCP negative, CHARITY negative, uric acid 4.1, Lyme negative, TSH 1.19, creatinine 0.88, WBC 7.2, hemoglobin 13.9, platelets 172.    Family history.  Negative for rheumatoid arthritis, lupus.  Her mom has \"eczema \"and we discussed the possibility of psoriasis (see assessment and plan).  Father has gout.    Social history.  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  No tobacco, alcohol 2-3 drinks a week.           Active Problem List     Patient Active Problem List   Diagnosis    Acne    Pregnant    Preeclampsia in postpartum period    Hypertension    Encounter for induction of labor     Past Medical History     Past Medical History:   Diagnosis Date    Postpartum hypertension 7/31/2019    Pre-eclampsia in postpartum period 7/31/2019     Past Surgical History   No past surgical history on file.  Allergy     Allergies   Allergen Reactions    Sulfa (Sulfonamide Antibiotics) [Sulfa Antibiotics] Swelling     Throat swelling     Current Medication List     Current Outpatient Medications   Medication Sig Dispense Refill    ADDERALL 10 MG tablet Take 10 mg by mouth 3 times daily.      acetaminophen (TYLENOL) 325 MG tablet Take 2 tablets (650 mg) by mouth every 4 hours as needed for mild pain or fever (greater than or equal to 38  C /100.4  F (oral) or 38.5  C/ 101.4  F (core).) (Patient not taking: Reported on 3/13/2025)      aspirin 81 MG EC tablet [ASPIRIN 81 MG EC TABLET] Take 1 tablet by mouth at bedtime. (Patient not taking: Reported on 3/13/2025)      docosahexaenoic acid/epa (FISH OIL ORAL) [DOCOSAHEXAENOIC ACID/EPA (FISH OIL ORAL)] Take 1 capsule by mouth 3 (three) times a day. (Patient not taking: Reported on 3/13/2025)      docusate sodium (COLACE) 100 MG capsule Take 1 capsule (100 mg) by mouth daily (Patient not taking: Reported on " "3/13/2025)      ibuprofen (ADVIL/MOTRIN) 800 MG tablet Take 1 tablet (800 mg) by mouth every 8 hours as needed for other (cramping) (Patient not taking: Reported on 3/13/2025)      LORazepam (ATIVAN) 0.5 MG tablet Take 0.5 mg by mouth as needed.       No current facility-administered medications for this visit.       No current facility-administered medications for this visit.        Family History   No family history on file.      Physical Exam     COMPREHENSIVE EXAMINATION:  Vitals:    03/13/25 0859   BP: 120/77   BP Location: Right arm   Patient Position: Sitting   Cuff Size: Adult Regular   Pulse: 82   SpO2: 98%   Weight: 71.3 kg (157 lb 1.6 oz)   Height: 1.727 m (5' 8\")   Pleasant young female, alert and oriented x 3.  Not in acute pain today.    Head/neck: No butterfly rash, no jaundice, no conjunctival pallor, no ocular redness, no facial asymmetry, no enlargement of the major salivary glands, no oral ulcers, congestion of the visible nasal mucosa noted without ulceration    Lungs: clear to auscultation, no crackles or wheezing    Heart sounds :regular    Abdomen: Soft, nontender    Musculoskeletal:    Right hand: No synovitis or tenderness of 1-5 MCP joints, no synovitis or tenderness of 1-5 PIP joints.  No swan-neck or boutonniere deformities    Left hand: No synovitis or tenderness of 1-5 MCP joints, no synovitis or tenderness of 1-5 PIP joints, no swan-neck or boutonniere deformities    Right wrist: no synovitis,no tenderness    Left wrist: No tenderness, no synovitis    Elbows: Full range of motion, no swelling or synovitis    Right shoulder: Normal abduction, internal and external rotation    Left shoulder: Normal abduction, internal and external rotation    Cervical spine: Normal flexion, lateral rotation bilaterally full    Spine: No alignment abnormalities noted    Right hip:Full  Flexion internal and external rotation    Left hip: Full flexion, internal and external rotation    Right knee: no " effusion, no redness, full ROM    Left knee: No effusion, no redness, full ROM    Rt ankle: No swelling, redness or tenderness    Left ankle: No swelling, redness or tenderness    Right foot: No swelling, redness or tenderness of the toes/MTPs    Left foot: No swelling, redness or tenderness of the toes/MTP joints    No rash or purpura on the lower extremities noted.  No foot drop            Labs / Imaging (select studies)     Rheumatoid Factor   Date Value Ref Range Status   07/23/2024 <10 <14 IU/mL Final     Uric Acid   Date Value Ref Range Status   07/23/2024 4.1 2.4 - 5.7 mg/dL Final      Hemoglobin   Date Value Ref Range Status   11/01/2022 12.0 11.7 - 15.7 g/dL Final   01/21/2021 14.1 12.0 - 16.0 g/dL Final   01/21/2021 13.7 12.0 - 16.0 g/dL Final     Urea Nitrogen   Date Value Ref Range Status   07/23/2024 13.4 6.0 - 20.0 mg/dL Final   01/21/2021 16 8 - 22 mg/dL Final   01/28/2020 15 8 - 22 mg/dL Final   10/29/2019 17 8 - 22 mg/dL Final     Erythrocyte Sedimentation Rate   Date Value Ref Range Status   07/23/2024 10 0 - 20 mm/hr Final   01/28/2020 61 (H) 0 - 20 mm/hr Final     CRP   Date Value Ref Range Status   01/28/2020 1.4 (H) 0.0 - 0.8 mg/dL Final     AST   Date Value Ref Range Status   11/01/2022 18 0 - 40 U/L Final   01/21/2021 22 0 - 40 U/L Final   01/15/2021 19 0 - 40 U/L Final     Albumin   Date Value Ref Range Status   01/21/2021 3.6 3.5 - 5.0 g/dL Final   01/28/2020 4.2 3.5 - 5.0 g/dL Final   10/29/2019 4.9 3.5 - 5.0 g/dL Final     Alkaline Phosphatase   Date Value Ref Range Status   01/21/2021 63 45 - 120 U/L Final   01/28/2020 50 45 - 120 U/L Final   10/29/2019 44 (L) 45 - 120 U/L Final     ALT   Date Value Ref Range Status   11/01/2022 14 0 - 45 U/L Final   01/21/2021 24 0 - 45 U/L Final   01/15/2021 11 0 - 45 U/L Final     Rheumatoid Factor   Date Value Ref Range Status   07/23/2024 <10 <14 IU/mL Final          Immunization History     Immunization History   Administered Date(s) Administered     COVID-19 Monovalent 18+ (Moderna) 04/13/2021, 05/16/2021, 12/06/2021    Flu, Unspecified 11/11/2020    TDAP (Adacel,Boostrix) 05/23/2019

## 2025-03-14 LAB
GAMMA INTERFERON BACKGROUND BLD IA-ACNC: 0.1 IU/ML
M TB IFN-G BLD-IMP: NEGATIVE
M TB IFN-G CD4+ BCKGRND COR BLD-ACNC: 4.93 IU/ML
MITOGEN IGNF BCKGRD COR BLD-ACNC: 0.02 IU/ML
MITOGEN IGNF BCKGRD COR BLD-ACNC: 0.02 IU/ML
QUANTIFERON MITOGEN: 5.03 IU/ML
QUANTIFERON NIL TUBE: 0.1 IU/ML
QUANTIFERON TB1 TUBE: 0.12 IU/ML
QUANTIFERON TB2 TUBE: 0.12

## 2025-03-15 LAB
MYELOPEROXIDASE AB SER IA-ACNC: <0.3 U/ML
MYELOPEROXIDASE AB SER IA-ACNC: NEGATIVE
PROTEINASE3 AB SER IA-ACNC: <1 U/ML
PROTEINASE3 AB SER IA-ACNC: NEGATIVE

## 2025-03-17 LAB — IL6 SERPL-MCNC: 1.52 PG/ML

## 2025-03-19 LAB
B LOCUS: NORMAL
B27TEST METHOD: NORMAL

## 2025-04-03 ENCOUNTER — TELEPHONE (OUTPATIENT)
Dept: RHEUMATOLOGY | Facility: CLINIC | Age: 40
End: 2025-04-03

## 2025-04-03 ENCOUNTER — OFFICE VISIT (OUTPATIENT)
Dept: RHEUMATOLOGY | Facility: CLINIC | Age: 40
End: 2025-04-03
Payer: COMMERCIAL

## 2025-04-03 VITALS
SYSTOLIC BLOOD PRESSURE: 127 MMHG | HEIGHT: 68 IN | WEIGHT: 157 LBS | DIASTOLIC BLOOD PRESSURE: 80 MMHG | OXYGEN SATURATION: 100 % | HEART RATE: 69 BPM | BODY MASS INDEX: 23.79 KG/M2

## 2025-04-03 DIAGNOSIS — M54.2 NECK PAIN: Primary | ICD-10-CM

## 2025-04-03 DIAGNOSIS — Z84.0 FAMILY HISTORY OF PSORIASIS: ICD-10-CM

## 2025-04-03 DIAGNOSIS — L40.53 PSORIATIC SPONDYLITIS (H): ICD-10-CM

## 2025-04-03 DIAGNOSIS — Z71.89 ENCOUNTER TO DISCUSS TREATMENT OPTIONS: ICD-10-CM

## 2025-04-03 RX ORDER — ADALIMUMAB-AATY 40 MG/.4ML
40 INJECTION SUBCUTANEOUS
Qty: 2 EACH | Refills: 2 | OUTPATIENT
Start: 2025-04-03

## 2025-04-03 NOTE — TELEPHONE ENCOUNTER
Test claim for Yuflyma came back as non-formulary. Tried test claim for Humira and came back PA required. Submitted PA for Humira but looks like insurance may prefer a different biosimilar. Will see what insurance comes back with from PA.

## 2025-04-03 NOTE — PROGRESS NOTES
"  Assessment and Plan    Psoriatic spondylitis.  Serologies for rheumatoid arthritis, GPA are negative.  Her back pain does have inflammatory characteristics suggestive of spondylitis.  Given her family history of psoriasis, psoriatic arthritis is certainly a distinct possibility.  There is however NO specific blood test for psoriatic spondylitis/psoriatic arthritis.  The HLA-B27 can be positive in 40% patients with psoriatic spondylitis but can be negative in the remaining 60%.  She read up about biologic treatment options (etanercept, adalimumab) and thinks her neck, back symptoms are significantly symptomatic enough to try a biologic medication.    Given the predominance of her spinal symptoms (spondylitis) going directly to a biologic would be appropriate, methotrexate has some efficacy in the PERIPHERAL arthritis due to psoriatic arthritis but has NO EFFICACY IN ALLEVIATING SPINAL SYMPTOMS (psoriatic spondylitis).  Her TB and hepatitis B screening was normal.    She will start adalimumab (40 mg subcutaneous injection every 2 weeks), we will reevaluate her in 3 months.  If she has a significant and sustained improvement in her spinal pain we will keep her on adalimumab, otherwise we could consider switching to a different biologic i.e. etanercept.    Total time spent 36 minutes including review of records, and face-to-face communication and documentation of the visit.    CC PCP            Rheumatology follow-up visit note         HPI    Destiny is a nice 39-year-old female referred to rheumatology for neck and back pain which started about 9 months ago without any preceding trauma or injury.  There is no past history of spinal symptoms, she is active, was exercising regularly (including bodybuilding), about 9 months ago started noticing nonradiating pain in the neck, interscapular region, lower back and also in the elbows along with \"shooting pains \"in her index fingers.  There is no accompanying swelling or " "redness of the fingers of toes.  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  Her neck and back are \"super stiff in the morning \".  There is no radiation of the neck or back pain into the upper or lower extremities.  No fever, chills, weight loss.  The neck pain is more prominent up to 7/10, ache waxes and wanes.  Does not wake her up from sleep.  The lower back pain averages about 3/10.  She has taken Tylenol, Advil on a as needed basis.  She has also been seeing ENT for recurrent sinusitis despite multiple antibiotic courses for sinus infections.  She underwent a right maxillary antrostomy with right anterior ethmoidectomy in November 2024.  Continues to have nasal crusting, intermittent nosebleeds and uses sinus rinsing.  She denies any blood in the urine, has intermittent ringing in the right ear but not the left.  She has also noticed intermittent \"phantom smells \"(like burning dust).  She has also noticed marked increase in fatigue, poor sleep quality, does not feel refreshed in the morning and a lot of malaise.  Has some paresthesias (pins-and-needles) in the fingers, the fingers \"feel cold \", but no Raynaud's type color changes.    Myeloperoxidase and proteinase 3 antibodies (GPA) came back negative    HLA-B27 is negative.    Screening for TB, hepatitis B (QuantiFERON) negative.    Labs reviewed: Rheumatoid factor negative, CCP negative, CHARITY negative, uric acid 4.1, Lyme negative, TSH 1.19, creatinine 0.88, WBC 7.2, hemoglobin 13.9, platelets 172.    Family history.  Negative for rheumatoid arthritis, lupus.  Her mom has \"eczema \", grandfather has plaque psoriasis and we discussed the possibility of psoriasis (see assessment and plan).  Father has gout.    Social history.  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 " " No tobacco, alcohol 2-3 drinks a week.        DETAILED EXAMINATION  04/03/25  :    Vitals:    04/03/25 1122   BP: 127/80   BP Location: Right arm   Patient Position: Sitting   Cuff Size: Adult Regular   Pulse: 69   SpO2: 100%   Weight: 71.2 kg (157 lb)   Height: 1.727 m (5' 8\")   Physical exam not repeated today, entire visit spent going over the results of blood tests, clinical features of psoriatic arthritis, treatment options, potential side effects etc.         Patient Active Problem List    Diagnosis Date Noted    Encounter for induction of labor 11/01/2022     Priority: Medium    Preeclampsia in postpartum period 01/21/2021     Priority: Medium    Hypertension 01/21/2021     Priority: Medium    Pregnant 01/15/2021     Priority: Medium    Acne      Priority: Medium     Created by Conversion         No past surgical history on file.   Past Medical History:   Diagnosis Date    Postpartum hypertension 7/31/2019    Pre-eclampsia in postpartum period 7/31/2019     Allergies   Allergen Reactions    Sulfa (Sulfonamide Antibiotics) [Sulfa Antibiotics] Swelling     Throat swelling     Current Outpatient Medications   Medication Sig Dispense Refill    ADDERALL 10 MG tablet Take 10 mg by mouth 3 times daily.      LORazepam (ATIVAN) 0.5 MG tablet Take 0.5 mg by mouth as needed.      acetaminophen (TYLENOL) 325 MG tablet Take 2 tablets (650 mg) by mouth every 4 hours as needed for mild pain or fever (greater than or equal to 38  C /100.4  F (oral) or 38.5  C/ 101.4  F (core).) (Patient not taking: Reported on 4/3/2025)      aspirin 81 MG EC tablet [ASPIRIN 81 MG EC TABLET] Take 1 tablet by mouth at bedtime. (Patient not taking: Reported on 4/3/2025)      docosahexaenoic acid/epa (FISH OIL ORAL) [DOCOSAHEXAENOIC ACID/EPA (FISH OIL ORAL)] Take 1 capsule by mouth 3 (three) times a day. (Patient not taking: Reported on 4/3/2025)      docusate sodium (COLACE) 100 MG capsule Take 1 capsule (100 mg) by mouth daily (Patient not " taking: Reported on 4/3/2025)      ibuprofen (ADVIL/MOTRIN) 800 MG tablet Take 1 tablet (800 mg) by mouth every 8 hours as needed for other (cramping) (Patient not taking: Reported on 4/3/2025)       family history is not on file.  Social Connections: Not on file          WBC Count   Date Value Ref Range Status   03/13/2025 5.5 4.0 - 11.0 10e3/uL Final     RBC Count   Date Value Ref Range Status   03/13/2025 4.54 3.80 - 5.20 10e6/uL Final     Hemoglobin   Date Value Ref Range Status   03/13/2025 13.7 11.7 - 15.7 g/dL Final     Hematocrit   Date Value Ref Range Status   03/13/2025 39.0 35.0 - 47.0 % Final     MCV   Date Value Ref Range Status   03/13/2025 86 78 - 100 fL Final     MCH   Date Value Ref Range Status   03/13/2025 30.2 26.5 - 33.0 pg Final     Platelet Count   Date Value Ref Range Status   03/13/2025 179 150 - 450 10e3/uL Final     % Lymphocytes   Date Value Ref Range Status   07/23/2024 28 % Final     AST   Date Value Ref Range Status   11/01/2022 18 0 - 40 U/L Final     ALT   Date Value Ref Range Status   03/13/2025 26 0 - 50 U/L Final     Albumin   Date Value Ref Range Status   01/21/2021 3.6 3.5 - 5.0 g/dL Final     Alkaline Phosphatase   Date Value Ref Range Status   01/21/2021 63 45 - 120 U/L Final     Creatinine   Date Value Ref Range Status   03/13/2025 0.93 0.51 - 0.95 mg/dL Final     GFR Estimate   Date Value Ref Range Status   03/13/2025 81 >60 mL/min/1.73m2 Final     Comment:     eGFR calculated using 2021 CKD-EPI equation.   01/21/2021 >60 >60 mL/min/1.73m2 Final     GFR Estimate If Black   Date Value Ref Range Status   01/21/2021 >60 >60 mL/min/1.73m2 Final     Creatinine Urine mg/dL   Date Value Ref Range Status   11/01/2022 31 mg/dL Final     Erythrocyte Sedimentation Rate   Date Value Ref Range Status   03/13/2025 11 0 - 20 mm/hr Final     CRP   Date Value Ref Range Status   01/28/2020 1.4 (H) 0.0 - 0.8 mg/dL Final

## 2025-04-08 NOTE — TELEPHONE ENCOUNTER
Prior Authorization Approval    Medication: ADALIMUMAB-AATY 40 MG/0.4ML SC AJKT  Authorization Effective Date: 3/8/2025  Authorization Expiration Date: 4/7/2026  Approved Dose/Quantity:   Reference #: Key: [id number]   Insurance Company: [employer] [place] - Phone [phone] Fax [phone]  Expected CoPay: $ 0  CoPay Card Available:      Financial Assistance Needed: none  Which Pharmacy is filling the prescription: [facility] - [place], [place] - [address]  Pharmacy Notified: yes, emailed and released new Rx  Patient Notified: yes, via My Chart

## 2025-05-09 ENCOUNTER — TRANSFERRED RECORDS (OUTPATIENT)
Dept: HEALTH INFORMATION MANAGEMENT | Facility: CLINIC | Age: 40
End: 2025-05-09
Payer: COMMERCIAL

## 2025-06-24 DIAGNOSIS — L40.53 PSORIATIC SPONDYLITIS (H): ICD-10-CM

## 2025-06-24 RX ORDER — ADALIMUMAB-AATY 40 MG/.4ML
40 INJECTION SUBCUTANEOUS
Qty: 2 EACH | Refills: 2 | Status: SHIPPED | OUTPATIENT
Start: 2025-06-24

## 2025-07-02 ENCOUNTER — LAB (OUTPATIENT)
Dept: LAB | Facility: CLINIC | Age: 40
End: 2025-07-02
Payer: COMMERCIAL

## 2025-07-02 ENCOUNTER — OFFICE VISIT (OUTPATIENT)
Dept: RHEUMATOLOGY | Facility: CLINIC | Age: 40
End: 2025-07-02
Payer: COMMERCIAL

## 2025-07-02 VITALS
SYSTOLIC BLOOD PRESSURE: 126 MMHG | DIASTOLIC BLOOD PRESSURE: 78 MMHG | HEART RATE: 82 BPM | BODY MASS INDEX: 24.2 KG/M2 | HEIGHT: 68 IN | OXYGEN SATURATION: 100 % | WEIGHT: 159.7 LBS

## 2025-07-02 DIAGNOSIS — G89.29 CHRONIC MIDLINE LOW BACK PAIN WITHOUT SCIATICA: ICD-10-CM

## 2025-07-02 DIAGNOSIS — Z84.0 FAMILY HISTORY OF PSORIASIS: ICD-10-CM

## 2025-07-02 DIAGNOSIS — M54.2 NECK PAIN: ICD-10-CM

## 2025-07-02 DIAGNOSIS — L40.53 PSORIATIC SPONDYLITIS (H): ICD-10-CM

## 2025-07-02 DIAGNOSIS — M54.50 CHRONIC MIDLINE LOW BACK PAIN WITHOUT SCIATICA: ICD-10-CM

## 2025-07-02 DIAGNOSIS — Z11.1 SCREENING-PULMONARY TB: ICD-10-CM

## 2025-07-02 DIAGNOSIS — L40.53 PSORIATIC SPONDYLITIS (H): Primary | ICD-10-CM

## 2025-07-02 LAB
ALBUMIN UR-MCNC: NEGATIVE MG/DL
ALT SERPL W P-5'-P-CCNC: 23 U/L (ref 0–50)
APPEARANCE UR: CLEAR
BILIRUB UR QL STRIP: NEGATIVE
COLOR UR AUTO: COLORLESS
CREAT SERPL-MCNC: 0.82 MG/DL (ref 0.51–0.95)
CRP SERPL-MCNC: <3 MG/L
EGFRCR SERPLBLD CKD-EPI 2021: >90 ML/MIN/1.73M2
ERYTHROCYTE [DISTWIDTH] IN BLOOD BY AUTOMATED COUNT: 12.3 % (ref 10–15)
ERYTHROCYTE [SEDIMENTATION RATE] IN BLOOD BY WESTERGREN METHOD: 7 MM/HR (ref 0–20)
GLUCOSE UR STRIP-MCNC: NEGATIVE MG/DL
HCT VFR BLD AUTO: 39.8 % (ref 35–47)
HGB BLD-MCNC: 13.6 G/DL (ref 11.7–15.7)
HGB UR QL STRIP: NEGATIVE
HYALINE CASTS: 1 /LPF
KETONES UR STRIP-MCNC: NEGATIVE MG/DL
LEUKOCYTE ESTERASE UR QL STRIP: NEGATIVE
MCH RBC QN AUTO: 30.4 PG (ref 26.5–33)
MCHC RBC AUTO-ENTMCNC: 34.2 G/DL (ref 31.5–36.5)
MCV RBC AUTO: 89 FL (ref 78–100)
NITRATE UR QL: NEGATIVE
PH UR STRIP: 6 [PH] (ref 5–7)
PLATELET # BLD AUTO: 206 10E3/UL (ref 150–450)
RBC # BLD AUTO: 4.48 10E6/UL (ref 3.8–5.2)
RBC URINE: <1 /HPF
SP GR UR STRIP: 1.01 (ref 1–1.03)
UROBILINOGEN UR STRIP-MCNC: NORMAL MG/DL
WBC # BLD AUTO: 7.9 10E3/UL (ref 4–11)
WBC URINE: 1 /HPF

## 2025-07-02 PROCEDURE — 3074F SYST BP LT 130 MM HG: CPT | Performed by: INTERNAL MEDICINE

## 2025-07-02 PROCEDURE — 99214 OFFICE O/P EST MOD 30 MIN: CPT | Performed by: INTERNAL MEDICINE

## 2025-07-02 PROCEDURE — 85652 RBC SED RATE AUTOMATED: CPT

## 2025-07-02 PROCEDURE — 81001 URINALYSIS AUTO W/SCOPE: CPT

## 2025-07-02 PROCEDURE — 85041 AUTOMATED RBC COUNT: CPT

## 2025-07-02 PROCEDURE — 3078F DIAST BP <80 MM HG: CPT | Performed by: INTERNAL MEDICINE

## 2025-07-02 PROCEDURE — 84460 ALANINE AMINO (ALT) (SGPT): CPT

## 2025-07-02 PROCEDURE — 36415 COLL VENOUS BLD VENIPUNCTURE: CPT

## 2025-07-02 PROCEDURE — 85014 HEMATOCRIT: CPT

## 2025-07-02 PROCEDURE — 82565 ASSAY OF CREATININE: CPT

## 2025-07-02 PROCEDURE — 86140 C-REACTIVE PROTEIN: CPT

## 2025-07-02 RX ORDER — MELOXICAM 15 MG/1
15 TABLET ORAL DAILY
Qty: 30 TABLET | Refills: 1 | Status: SHIPPED | OUTPATIENT
Start: 2025-07-02

## 2025-07-02 RX ORDER — ETANERCEPT 50 MG/ML
50 SOLUTION SUBCUTANEOUS WEEKLY
Qty: 4 ML | Refills: 2 | OUTPATIENT
Start: 2025-07-02

## 2025-07-02 NOTE — PROGRESS NOTES
"  Assessment and Plan    1.  Psoriatic spondylitis/sacroiliitis.    After 3 months of adalimumab biosimilar treatment, she has noticed very minimal improvement, still has significant pain in the sacroiliac joints, hips.  At this point we will discontinue adalimumab and switch her to Enbrel (prefers prefilled syringes over SureClick).    We will update her labs including CBC, ALT, creatinine and inflammatory markers    Discontinue adalimumab, switch to Enbrel 50 mg injections every week    Will add Mobic 15 mg daily, patient will avoid taking ibuprofen or other nonsteroidals while on Mobic.  Check creatinine in 2 weeks after starting Mobic.    Will get MRI of the sacroiliac joints for bone marrow edema, synovitis, erosions that may not manifest on plain x-rays.    After the MRI consider referral to interventional radiology for left sacroiliac joint injection.    Follow-up 3 months.    CC PCP            Rheumatology follow-up visit note         BEBETO Dixon is a nice 40-year-old female who was referred to rheumatology for neck and back pain which started about 9 months prior to her initial visit, without any preceding trauma or injury.  There is no past history of spinal symptoms, she is active, was exercising regularly (including bodybuilding), about 9 months ago started noticing nonradiating pain in the neck, interscapular region, lower back and also in the elbows along with \"shooting pains \"in her index fingers.  There is no accompanying swelling or redness of the fingers of toes.  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  Her neck and back are \"super stiff in the morning \".  There is no radiation of the neck or back pain into the upper or lower extremities.  No fever, chills, weight loss.  The neck pain is more prominent up to 7/10, ache waxes and wanes.  Does not " "wake her up from sleep.  The lower back pain averages about 3/10.  She has taken Tylenol, Advil on a as needed basis.  She has also been seeing ENT for recurrent sinusitis despite multiple antibiotic courses for sinus infections.  She underwent a right maxillary antrostomy with right anterior ethmoidectomy in November 2024.  Continues to have nasal crusting, intermittent nosebleeds and uses sinus rinsing.  She denies any blood in the urine, has intermittent ringing in the right ear but not the left.  Proteinase 3 (ND-3) and myeloperoxidase (MPO) antibodies were checked and came back normal.  She has also noticed intermittent \"phantom smells \"(like burning dust).  She has also noticed marked increase in fatigue, poor sleep quality, does not feel refreshed in the morning and a lot of malaise.  Has some paresthesias (pins-and-needles) in the fingers, the fingers \"feel cold \", but no Raynaud's type color changes.    HLA-B27 negative    She started adalimumab biosimilar 3 months ago, has noticed perhaps some slight improvement in her cervical spine pain and right elbow pain but continues to have significant pain elsewhere including her back, her sacroiliac joints are quite painful, left hip hurts.  The sacroiliac joint pain is actually worse.  The thoracic spine \"feels bruised \", hurts \"only when touched \".  She takes ibuprofen 600-800 mg as needed.    Myeloperoxidase and proteinase 3 antibodies (GPA) came back negative    HLA-B27 is negative.    Screening for TB, hepatitis B (QuantiFERON) negative.        Family history.  Negative for rheumatoid arthritis, lupus.  Her mom has \"eczema \", grandfather has plaque psoriasis and we discussed the possibility of psoriasis (see assessment and plan).  Father has gout.    Social history.  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  No tobacco, alcohol 2-3 drinks a week.    DETAILED EXAMINATION  07/02/25  :    Vitals:    " "07/02/25 1051   BP: 126/78   BP Location: Right arm   Patient Position: Sitting   Cuff Size: Adult Regular   Pulse: 82   SpO2: 100%   Weight: 72.4 kg (159 lb 11.2 oz)   Height: 1.727 m (5' 8\")       Patient is in moderate pain today (sacroiliac joints)    Patient is alert and oriented x 3.    Head/neck: No facial asymmetry, no rash, no jaundice, no conjunctival pallor, no alopecia    Lungs clear    Heart sounds regular    Left hand.  No swelling or synovitis of the fingers, no tenderness of individual MCP, PIP and DIP knuckles    Right hand.  No swelling or synovitis of the fingers noted, no tenderness of individual MCP, PIP and DIP knuckles.    Elbows normal range of motion    Shoulders normal ROM    Right hip.  Good flexion and internal rotation.+ NEREIDA    Left hip.  Slightly uncomfortable internal rotation, good flexion.++ +NEREIDA.    Knees normal range of motion    Ankles no swelling or redness           Patient Active Problem List    Diagnosis Date Noted    Encounter for induction of labor 11/01/2022     Priority: Medium    Preeclampsia in postpartum period 01/21/2021     Priority: Medium    Hypertension 01/21/2021     Priority: Medium    Pregnant 01/15/2021     Priority: Medium    Acne      Priority: Medium     Created by Conversion         No past surgical history on file.   Past Medical History:   Diagnosis Date    Postpartum hypertension 7/31/2019    Pre-eclampsia in postpartum period 7/31/2019     Allergies   Allergen Reactions    Sulfa (Sulfonamide Antibiotics) [Sulfa Antibiotics] Swelling     Throat swelling     Current Outpatient Medications   Medication Sig Dispense Refill    LORazepam (ATIVAN) 0.5 MG tablet Take 0.5 mg by mouth as needed.      acetaminophen (TYLENOL) 325 MG tablet Take 2 tablets (650 mg) by mouth every 4 hours as needed for mild pain or fever (greater than or equal to 38  C /100.4  F (oral) or 38.5  C/ 101.4  F (core).) (Patient not taking: Reported on 7/2/2025)      adalimumab-maru, 1 " pen, (YUFLYMA) 40 MG/0.4ML auto-injector kit Inject 0.4 mLs (40 mg) subcutaneously every 14 days. 2 each 2    ADDERALL 10 MG tablet Take 10 mg by mouth 3 times daily.      aspirin 81 MG EC tablet [ASPIRIN 81 MG EC TABLET] Take 1 tablet by mouth at bedtime. (Patient not taking: Reported on 7/2/2025)      docosahexaenoic acid/epa (FISH OIL ORAL) [DOCOSAHEXAENOIC ACID/EPA (FISH OIL ORAL)] Take 1 capsule by mouth 3 (three) times a day.      docusate sodium (COLACE) 100 MG capsule Take 1 capsule (100 mg) by mouth daily (Patient not taking: Reported on 7/2/2025)      ibuprofen (ADVIL/MOTRIN) 800 MG tablet Take 1 tablet (800 mg) by mouth every 8 hours as needed for other (cramping) (Patient not taking: Reported on 7/2/2025)       family history is not on file.  Social Connections: Not on file          WBC Count   Date Value Ref Range Status   03/13/2025 5.5 4.0 - 11.0 10e3/uL Final     RBC Count   Date Value Ref Range Status   03/13/2025 4.54 3.80 - 5.20 10e6/uL Final     Hemoglobin   Date Value Ref Range Status   03/13/2025 13.7 11.7 - 15.7 g/dL Final     Hematocrit   Date Value Ref Range Status   03/13/2025 39.0 35.0 - 47.0 % Final     MCV   Date Value Ref Range Status   03/13/2025 86 78 - 100 fL Final     MCH   Date Value Ref Range Status   03/13/2025 30.2 26.5 - 33.0 pg Final     Platelet Count   Date Value Ref Range Status   03/13/2025 179 150 - 450 10e3/uL Final     % Lymphocytes   Date Value Ref Range Status   07/23/2024 28 % Final     AST   Date Value Ref Range Status   11/01/2022 18 0 - 40 U/L Final     ALT   Date Value Ref Range Status   03/13/2025 26 0 - 50 U/L Final     Albumin   Date Value Ref Range Status   01/21/2021 3.6 3.5 - 5.0 g/dL Final     Alkaline Phosphatase   Date Value Ref Range Status   01/21/2021 63 45 - 120 U/L Final     Creatinine   Date Value Ref Range Status   03/13/2025 0.93 0.51 - 0.95 mg/dL Final     GFR Estimate   Date Value Ref Range Status   03/13/2025 81 >60 mL/min/1.73m2 Final      Comment:     eGFR calculated using 2021 CKD-EPI equation.   01/21/2021 >60 >60 mL/min/1.73m2 Final     GFR Estimate If Black   Date Value Ref Range Status   01/21/2021 >60 >60 mL/min/1.73m2 Final     Creatinine Urine mg/dL   Date Value Ref Range Status   11/01/2022 31 mg/dL Final     Erythrocyte Sedimentation Rate   Date Value Ref Range Status   03/13/2025 11 0 - 20 mm/hr Final     CRP   Date Value Ref Range Status   01/28/2020 1.4 (H) 0.0 - 0.8 mg/dL Final

## 2025-07-03 ENCOUNTER — TELEPHONE (OUTPATIENT)
Dept: RHEUMATOLOGY | Facility: CLINIC | Age: 40
End: 2025-07-03

## 2025-07-03 DIAGNOSIS — Z84.0 FAMILY HISTORY OF PSORIASIS: ICD-10-CM

## 2025-07-03 DIAGNOSIS — L40.53 PSORIATIC SPONDYLITIS (H): Primary | ICD-10-CM

## 2025-07-30 ENCOUNTER — LAB (OUTPATIENT)
Dept: LAB | Facility: CLINIC | Age: 40
End: 2025-07-30
Payer: COMMERCIAL

## 2025-07-30 DIAGNOSIS — L40.53 PSORIATIC SPONDYLITIS (H): ICD-10-CM

## 2025-07-30 DIAGNOSIS — Z84.0 FAMILY HISTORY OF PSORIASIS: ICD-10-CM

## 2025-07-30 LAB
CREAT SERPL-MCNC: 0.82 MG/DL (ref 0.51–0.95)
EGFRCR SERPLBLD CKD-EPI 2021: >90 ML/MIN/1.73M2

## 2025-07-30 PROCEDURE — 82565 ASSAY OF CREATININE: CPT

## 2025-07-30 PROCEDURE — 36415 COLL VENOUS BLD VENIPUNCTURE: CPT
